# Patient Record
Sex: FEMALE | Race: WHITE | NOT HISPANIC OR LATINO | Employment: UNEMPLOYED | URBAN - METROPOLITAN AREA
[De-identification: names, ages, dates, MRNs, and addresses within clinical notes are randomized per-mention and may not be internally consistent; named-entity substitution may affect disease eponyms.]

---

## 2019-01-03 ENCOUNTER — CONVERSION ENCOUNTER (OUTPATIENT)
Dept: ORTHOPEDIC SURGERY | Facility: CLINIC | Age: 44
End: 2019-01-03

## 2019-01-03 ENCOUNTER — OFFICE VISIT CONVERTED (OUTPATIENT)
Dept: ORTHOPEDIC SURGERY | Facility: CLINIC | Age: 44
End: 2019-01-03
Attending: PHYSICIAN ASSISTANT

## 2019-08-09 ENCOUNTER — HOSPITAL ENCOUNTER (OUTPATIENT)
Dept: MAMMOGRAPHY | Facility: HOSPITAL | Age: 44
Discharge: HOME | End: 2019-08-09
Attending: NURSE PRACTITIONER

## 2019-08-16 ENCOUNTER — TELEPHONE (OUTPATIENT)
Dept: SURGERY | Facility: CLINIC | Age: 44
End: 2019-08-16

## 2019-08-16 NOTE — TELEPHONE ENCOUNTER
New patient appt to see Dr. Adan    Right breast tenderness, comes and goes,   The pain or tenderness is at Right Breast @ previously biopsied 2016 location 11:00.     Scheduled to see Dr. Miranda Friday 9/20/19 1030 arrival, 11:00 appt.     lml

## 2019-09-23 ENCOUNTER — OFFICE VISIT (OUTPATIENT)
Dept: SURGERY | Facility: CLINIC | Age: 44
End: 2019-09-23

## 2019-09-23 ENCOUNTER — TELEPHONE (OUTPATIENT)
Dept: SURGERY | Facility: CLINIC | Age: 44
End: 2019-09-23

## 2019-09-23 VITALS
DIASTOLIC BLOOD PRESSURE: 88 MMHG | HEIGHT: 64 IN | BODY MASS INDEX: 36.88 KG/M2 | WEIGHT: 216 LBS | OXYGEN SATURATION: 98 % | SYSTOLIC BLOOD PRESSURE: 128 MMHG | HEART RATE: 101 BPM

## 2019-09-23 DIAGNOSIS — N64.4 MASTODYNIA: Primary | ICD-10-CM

## 2019-09-23 DIAGNOSIS — Z80.3 FH: BREAST CANCER: ICD-10-CM

## 2019-09-23 PROCEDURE — 99243 OFF/OP CNSLTJ NEW/EST LOW 30: CPT | Performed by: SURGERY

## 2019-09-23 RX ORDER — ESTRADIOL 10 UG/1
INSERT VAGINAL
Refills: 12 | COMMUNITY
Start: 2019-07-31 | End: 2022-03-29

## 2019-09-23 NOTE — TELEPHONE ENCOUNTER
Patient let me know that she is getting recurrent yeast infections since on estradiol and would like to know if we could call her in something for this.     I called to let patient know that she should contact her gynecologist for this.   I had to leave a message for patient. ramos

## 2019-09-23 NOTE — PROGRESS NOTES
Chief Complaint: Kerry Collins is a 44 y.o. female who was seen in consultation at the request of Smita Aaron APRN  for breast pain    History of Present Illness:  Patient presents with breast pain. She noted no new masses, skin changes, nipple discharge, nipple changes prior to her most recent imaging.  She reports right-sided breast pain for many years.  She estimates it is been about 4 years.  She describes it as a swelling and pain in the right breast that is achy.  She describes it as being present intermittently all of the time.  She tells me that it was present before her 2016 biopsy and after her 2016 biopsy.  She tells me that it has not changed since she started taking estrogen about a month ago.  She drinks one 16 ounce Coke per day.  She tells me that she gained about 50 pounds 19 years ago but has been stable at this current weight for the past 15 or 20 years.    Her most recent imaging includes the followin2015  [facility]  Kerry Mckoy  Screening mammography.  Baseline mammogram.  Scattered fibroglandular tissue.  BIRADS 2    10/27/2015  [facility]     Oncology  Kerry Dennis  Surgical Oncology New Patient Visit.    2016  [facility]  Kerry Collins  BILATERAL DIAGNOSTIC MAMMOGRAM  Persistent 6 mm mass upper outer right breast middle 1/3.  Focused ultrasound upper outer right breast a few tiny cysts. 5 mm hypoechoic mass 11:00 position 10 cm from the nipple.  DIAGNOSTIC CATEGORY: 4A    2019  [facility]  Kerry Collins  DIGITAL DIAGNOSTIC BILAT AVRIL AND 3D DENISSE  Scattered fibroglandular density. 4 mm rounded mass right breast 11:00, just deep to the palpable marker. There is a biopsy marker clip in the lesion in the posterior upper outer quadrant of the right breast.  DIAGNOSTIC CATEGORY: 2, BENIGN FINDING      She had a biopsy on the following day that showed:   2016  [facility]   Radiology  Kerry Collins  A 13 gauge. A total of 6 core  biopsy. A biopsy clip was then placed.  Pathology is now available which reveals fragments suggestive of fibroadenoma. This is concordant.    09/28/2016  [facility]   Radiology  Kerry Collins  DIAGNOSTIC POST BIOPSY  Biopsy clip appears to be in the area of nodule of concern.    09/28/2016  [facility]   Pathology  Kerry Collins  RIGHT BREAST, ULTRASOUND-GUIDED CORE BIOPSY:  - SMALL FRAGMENT SUGGESTIVE OF FIBROADENOMA, IN A BACKGROUND OF FIBROFATTY BREAST TISSUE.        She has her ovaries, is postmenopausal, and takes estradiol twice a week.  She is been on this for about 1 month.  She had her uterus removed at age 40 for fibroids.  Her family history includes the following: She has 1 daughter, no sisters, 5 maternal aunts, no paternal aunts.  She has a maternal grandmother who had breast cancer at 75, a maternal grandfather who had pancreas cancer, a paternal grandmother who had breast cancer at uncertain age, a maternal aunt who had breast cancer at uncertain age, a maternal cousin female who had breast cancer at 35 of paternal cousin female who had breast cancer at 35.  She tells me that her paternal cousin had a BRCA mutation.  The patient had a my risk test sent April 14, 2015 that was negative for mutation.  She is here for evaluation.    Review of Systems:  Review of Systems   Constitutional: Positive for chills, diaphoresis, fatigue and unexpected weight change. Negative for appetite change.   Endocrine: Positive for hot flashes.   Musculoskeletal: Positive for gait problem.   Neurological: Positive for dizziness, gait problem, light-headedness and speech difficulty.   Psychiatric/Behavioral: Positive for confusion. The patient is nervous/anxious.    All other systems reviewed and are negative.       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  Breast Operations, and year:  NONE   Obstetric/Gynecologic History:  Age menstrual periods began: 14  Patient is postmenopausal due to removal of her uterus in the following year: 2015   Number of pregnancies:7  Number of live births: 5  Number of abortions or miscarriages: 2  Age of delivery of first child: 18  Patient breast fed, for the following lenth of time: COUPLE WEEKS   Length of time taking birth control pills: FEW MONTHS   Patient is presently taking the following hormone replacement: ESTRADIOL 10 MCG VAGINAL TABLET 2 X WEEK.     PATIENT HAD UTERUS REMOVED 2015, HAS BOTH OVARIES.     Past Surgical History:   Procedure Laterality Date   • BREAST BIOPSY     • GALLBLADDER SURGERY     • HYSTERECTOMY     • LASER ABLATION     • TUBAL ABDOMINAL LIGATION         Past Medical History:   Diagnosis Date   • Arthritis    • Breast pain    • HTN (hypertension)    • Vaginal yeast infection        Prior Hospitalizations, other than for surgery or childbirth, and year:  NONE     Social History     Socioeconomic History   • Marital status:      Spouse name: Not on file   • Number of children: Not on file   • Years of education: Not on file   • Highest education level: Not on file   Tobacco Use   • Smoking status: Never Smoker   • Smokeless tobacco: Never Used   Substance and Sexual Activity   • Alcohol use: Yes     Comment: OCC   • Drug use: No     Patient is .  Patient is employed full time with the following occupation:   Patient drinks 2-3 servings of caffeine per day.    Family History:  Family History   Problem Relation Age of Onset   • Lung cancer Mother    • Cancer Brother         ORAL/MOUTH   • Breast cancer Maternal Aunt         UNKNOWN AGE    • Cancer Paternal Uncle         UNKNOWN ORGIN    • Breast cancer Maternal Grandmother         70S   • Pancreatic cancer Maternal Grandfather         UNKNOWN AGE    • Breast cancer Paternal Grandmother         UNKNOWN AGE    •  "Breast cancer Cousin         30S   • Breast cancer Cousin         30S   • Cancer Brother         BONE   • Lung cancer Maternal Aunt    • Canavan disease Maternal Aunt    • Cancer Maternal Aunt         BLADDER       Vital Signs:  /88 (BP Location: Left arm, Patient Position: Sitting, Cuff Size: Adult)   Pulse 101   Ht 162.6 cm (64\")   Wt 98 kg (216 lb)   LMP  (LMP Unknown)   SpO2 98%   Breastfeeding? No   BMI 37.08 kg/m²      Medications:    Current Outpatient Medications:   •  Acetaminophen (TYLENOL ARTHRITIS PAIN PO), Take  by mouth., Disp: , Rfl:   •  estradiol (VAGIFEM) 10 MCG tablet vaginal tablet, , Disp: , Rfl: 12  •  metoprolol tartrate (LOPRESSOR) 25 MG tablet, Take 25 mg by mouth 2 (Two) Times a Day., Disp: , Rfl:      Allergies:  Allergies   Allergen Reactions   • Codeine Hives       Physical Examination:  /88 (BP Location: Left arm, Patient Position: Sitting, Cuff Size: Adult)   Pulse 101   Ht 162.6 cm (64\")   Wt 98 kg (216 lb)   LMP  (LMP Unknown)   SpO2 98%   Breastfeeding? No   BMI 37.08 kg/m²   General Appearance:  Patient is in no distress.  She is well kept and has an obese build.   Psychiatric:  Patient with appropriate mood and affect. Alert and oriented to self, time, and place.    Breast, RIGHT:  large sized, asymmetric with the contralateral side, right breast larger than left by about a full cup size..  Breast skin is without erythema, edema, rashes.  There are no visible abnormalities upon inspection during the arm-raising maneuver or with hands on hips in the sitting position. There is no nipple retraction, discharge or nipple/areolar skin changes.There are no masses palpable in the sitting or supine positions.  There is tenderness in the right breast upper outer quadrant location.  There is some glandular density in this area but no masses or focal findings.  There are no masses or skin changes in this location.    Breast, LEFT:  large sized, asymmetric with the " contralateral side, as above.  Breast skin is without erythema, edema, rashes.  There are no visible abnormalities upon inspection during the arm-raising maneuver or with hands on hips in the sitting position. There is no nipple retraction, discharge or nipple/areolar skin changes.There are no masses palpable in the sitting or supine positions.    Lymphatic:  There is no axillary, cervical, infraclavicular, or supraclavicular adenopathy bilaterally.  Eyes:  Pupils are round and reactive to light.  Cardiovascular:  Heart rate and rhythm are regular.  Respiratory:  Lungs are clear bilaterally with no crackles or wheezes in any lung field.  Gastrointestinal:  Abdomen is soft, nondistended, and nontender.     Musculoskeletal:  Good strength in all 4 extremities.   There is good range of motion in both shoulders.    Skin:  No new skin lesions or rashes on the skin excluding the breast (see breast exam above).      Imagin2015  [facility]     [facility]  Screening mammography.  Baseline mammogram.  Scattered fibroglandular tissue.  BIRADS 2    10/27/2015  [facility]     Oncology  [facility]  Surgical Oncology New Patient Visit.    2016  [facility]  BILATERAL DIAGNOSTIC MAMMOGRAM  Persistent 6 mm mass upper outer right breast middle 1/3.  Focused ultrasound upper outer right breast a few tiny cysts. 5 mm hypoechoic mass 11:00 position 10 cm from the nipple.  DIAGNOSTIC CATEGORY: 4A    2019  [facility]  DIGITAL DIAGNOSTIC BILAT AVRIL AND 3D DENISSE  Scattered fibroglandular density. 4 mm rounded mass right breast 11:00, just deep to the palpable marker. There is a biopsy marker clip in the lesion in the posterior upper outer quadrant of the right breast.  DIAGNOSTIC CATEGORY: 2, BENIGN FINDING    Pathology:  2016  [facility]   Radiology  [facility]  A 13 gauge. A total of 6 core biopsy. A biopsy clip was then placed.  Pathology is now  available which reveals fragments suggestive of fibroadenoma. This is concordant.    09/28/2016  [facility]   Radiology  Kerry Collins  DIAGNOSTIC POST BIOPSY  Biopsy clip appears to be in the area of nodule of concern.    09/28/2016  [facility]   Pathology  Kerry Collins  RIGHT BREAST, ULTRASOUND-GUIDED CORE BIOPSY:  - SMALL FRAGMENT SUGGESTIVE OF FIBROADENOMA, IN A BACKGROUND OF FIBROFATTY BREAST TISSUE.      LABS:   04/14/2015  [facility]   Genetic   Kerry Mckoy  GENETIC RESULT: NEGATIVE - NO CLINICALLY SIGNIFICANT MUTATION IDENTIFIED.          07/31/2019  [facility]  Office Visit  Kerry Collins  Previously consulted with Dr. Holley and Dr. Godwin regarding right breast pain.    Procedures:      Assessment:   Diagnosis Plan   1. Mastodynia     2. FH: breast cancer     Right breast upper outer quadrant, present for 4 years    2-  Maternal grandmother, paternal grandmother, maternal aunt, maternal cousin, paternal cousin.  The paternal cousin was BRCA positive.  The patient is BRCA negative as tested by a my risk panel in April 2015.      Plan:  Kerry and AMINATA reviewed her history, imaging, imaging reports and examination together today.  We discussed that the differential diagnosis of breast pain includes, but is not limited to: fibrocystic condition, macrocysts, fibroadenomas, breast cancer,  trauma to the breast or chest wall, inflammation or  other musculoskeletal disturbances of the chest wall.  There are no concerning findings on her examination today or on her most recent imaging for a focal cause of her pain- ie a mass, inflammation, or trauma. Both her exam and imaging are in good order. The most likely etiology of her breast pain is fibrocystic condition, meaning a hormonal responsiveness of the breast tissue.  We discussed that this pain can be aggravated by many things, including stress, caffeine, and fluctuations in hormone levels.     She has a  who intermittently commits  adultery and this has been problematic in their life for several years.  She is also feeling trapped within this relationship due to financial limitations.  We discussed that the significance of this external stressor and rise in her cortisol could contribute to her breast pain.  We discussed that her 16 ounce cola could contribute to her breast pain.  Her weight gain was long enough ago that I do not think this is a significant factor.  Her large breast side could contribute and the breast asymmetry makes it a little harder to find a good fitting bra.  We did show her her compression type bras in the office today that have adjustable straps where she could get adequate compression on both sides despite the asymmetry.       We discussed the most common interventions to alleviate her symptoms, including wearing a supportive bra, reducing caffeine intake, oral vitamin E 400 units qday or BID, or evening primrose oil 2000-3000mg orally daily. She understood.  We gave her a handout on this today.    We also discussed that the newly started estrogen may or may not result in an increase in her breast tenderness, as above.    We also discussed her family history.  The fact that her cousin had a BRCA mutation that she does not carry is quite favorable and we discussed this today.  I did calculate her lifetime risk of breast cancer using the CRA risk express and this calculated risk anywhere from 11 to 29%.  We discussed the option for MRI surveillance in addition to mammography and she was not interested in this at this time.      I asked her to continue her self breast exam and routine breast cancer screening and to call us in the future with any concerns would be happy to see her back.      Alexus Miranda MD        We have spent 40 minutes in visit today, 25 in face to face .      Next Appointment:  Return for any future concerns.      EMR Dragon/transcription disclaimer:    Much of this encounter note is an  electronic transcription/translocation of spoken language to printed text.  The electronic translation of spoken language may permit erroneous, or at times, nonsensical words or phrases to be inadvertently transcribed.  Although I have reviewed the note from such areas, some may still exist.

## 2019-10-14 ENCOUNTER — HOSPITAL ENCOUNTER (OUTPATIENT)
Dept: URGENT CARE | Facility: CLINIC | Age: 44
Discharge: HOME | End: 2019-10-14
Attending: FAMILY MEDICINE

## 2019-10-16 LAB — BACTERIA SPEC AEROBE CULT: NORMAL

## 2020-06-11 ENCOUNTER — OFFICE VISIT CONVERTED (OUTPATIENT)
Dept: GASTROENTEROLOGY | Facility: CLINIC | Age: 45
End: 2020-06-11
Attending: NURSE PRACTITIONER

## 2020-06-11 ENCOUNTER — CONVERSION ENCOUNTER (OUTPATIENT)
Dept: GASTROENTEROLOGY | Facility: CLINIC | Age: 45
End: 2020-06-11

## 2020-06-12 ENCOUNTER — HOSPITAL ENCOUNTER (OUTPATIENT)
Dept: GENERAL RADIOLOGY | Facility: HOSPITAL | Age: 45
Discharge: HOME | End: 2020-06-12
Attending: NURSE PRACTITIONER

## 2020-06-14 LAB — SARS-COV-2 RNA SPEC QL NAA+PROBE: NOT DETECTED

## 2020-06-16 ENCOUNTER — HOSPITAL ENCOUNTER (OUTPATIENT)
Dept: GASTROENTEROLOGY | Facility: HOSPITAL | Age: 45
Setting detail: HOSPITAL OUTPATIENT SURGERY
Discharge: HOME | End: 2020-06-16
Attending: INTERNAL MEDICINE

## 2020-08-05 ENCOUNTER — HOSPITAL ENCOUNTER (OUTPATIENT)
Dept: LAB | Facility: HOSPITAL | Age: 45
Discharge: HOME | End: 2020-08-05
Attending: NURSE PRACTITIONER

## 2020-08-06 ENCOUNTER — OFFICE VISIT CONVERTED (OUTPATIENT)
Dept: GASTROENTEROLOGY | Facility: CLINIC | Age: 45
End: 2020-08-06
Attending: NURSE PRACTITIONER

## 2020-08-24 ENCOUNTER — HOSPITAL ENCOUNTER (OUTPATIENT)
Dept: PREADMISSION TESTING | Facility: HOSPITAL | Age: 45
Discharge: HOME | End: 2020-08-24
Attending: INTERNAL MEDICINE

## 2020-08-25 LAB — SARS-COV-2 RNA SPEC QL NAA+PROBE: NOT DETECTED

## 2020-08-28 ENCOUNTER — HOSPITAL ENCOUNTER (OUTPATIENT)
Dept: GASTROENTEROLOGY | Facility: HOSPITAL | Age: 45
Setting detail: HOSPITAL OUTPATIENT SURGERY
Discharge: HOME | End: 2020-08-28
Attending: INTERNAL MEDICINE

## 2020-10-12 ENCOUNTER — HOSPITAL ENCOUNTER (OUTPATIENT)
Dept: URGENT CARE | Facility: CLINIC | Age: 45
Discharge: HOME | End: 2020-10-12
Attending: EMERGENCY MEDICINE

## 2020-10-20 ENCOUNTER — HOSPITAL ENCOUNTER (OUTPATIENT)
Dept: URGENT CARE | Facility: CLINIC | Age: 45
Discharge: HOME | End: 2020-10-20
Attending: EMERGENCY MEDICINE

## 2020-10-20 ENCOUNTER — HOSPITAL ENCOUNTER (OUTPATIENT)
Dept: PREADMISSION TESTING | Facility: HOSPITAL | Age: 45
Discharge: HOME | End: 2020-10-20
Attending: INTERNAL MEDICINE

## 2020-10-20 ENCOUNTER — OFFICE VISIT CONVERTED (OUTPATIENT)
Dept: GASTROENTEROLOGY | Facility: CLINIC | Age: 45
End: 2020-10-20
Attending: NURSE PRACTITIONER

## 2020-10-20 LAB — SARS-COV-2 RNA SPEC QL NAA+PROBE: NOT DETECTED

## 2020-10-21 ENCOUNTER — HOSPITAL ENCOUNTER (OUTPATIENT)
Dept: GASTROENTEROLOGY | Facility: HOSPITAL | Age: 45
Setting detail: HOSPITAL OUTPATIENT SURGERY
Discharge: HOME | End: 2020-10-21
Attending: INTERNAL MEDICINE

## 2020-11-12 ENCOUNTER — HOSPITAL ENCOUNTER (OUTPATIENT)
Dept: MAMMOGRAPHY | Facility: HOSPITAL | Age: 45
Discharge: HOME | End: 2020-11-12
Attending: NURSE PRACTITIONER

## 2020-11-18 ENCOUNTER — HOSPITAL ENCOUNTER (OUTPATIENT)
Dept: PREADMISSION TESTING | Facility: HOSPITAL | Age: 45
Discharge: HOME | End: 2020-11-18
Attending: INTERNAL MEDICINE

## 2020-11-18 ENCOUNTER — OFFICE VISIT CONVERTED (OUTPATIENT)
Dept: GASTROENTEROLOGY | Facility: CLINIC | Age: 45
End: 2020-11-18
Attending: NURSE PRACTITIONER

## 2020-11-18 LAB — SARS-COV-2 RNA SPEC QL NAA+PROBE: NOT DETECTED

## 2020-11-20 ENCOUNTER — HOSPITAL ENCOUNTER (OUTPATIENT)
Dept: GASTROENTEROLOGY | Facility: HOSPITAL | Age: 45
Setting detail: HOSPITAL OUTPATIENT SURGERY
Discharge: HOME | End: 2020-11-20
Attending: INTERNAL MEDICINE

## 2021-01-26 ENCOUNTER — OFFICE VISIT CONVERTED (OUTPATIENT)
Dept: GASTROENTEROLOGY | Facility: CLINIC | Age: 46
End: 2021-01-26
Attending: NURSE PRACTITIONER

## 2021-02-23 ENCOUNTER — HOSPITAL ENCOUNTER (OUTPATIENT)
Dept: URGENT CARE | Facility: CLINIC | Age: 46
Discharge: HOME | End: 2021-02-23
Attending: FAMILY MEDICINE

## 2021-04-21 ENCOUNTER — HOSPITAL ENCOUNTER (OUTPATIENT)
Dept: URGENT CARE | Facility: CLINIC | Age: 46
Discharge: HOME | End: 2021-04-21
Attending: FAMILY MEDICINE

## 2021-05-10 NOTE — H&P
History and Physical      Patient Name: Kerry Collins   Patient ID: [id number]   Sex: Female   YOB: 1975    Primary Care Provider: Gerald Akhtar MD   Referring Provider: Gerald Akhtar MD    Visit Date: June 11, 2020    Provider: DELPHINE Painting   Location: [facility]   Location Address: [address] [place] [address], [address]  [place], [place]  [id number]   Location Phone: [phone]          Chief Complaint  · Dysphagia      History Of Present Illness  The patient is a 45 year old /White female who presents on referral from Gerald Akhtar MD for a gastroenterology evaluation.      She presents for evaluation of dysphagia.      States that about two years ago, she noticed things getting stuck when swallowing, but it didn't happen often.      She now feels like she's choking more frequently, even with liquids.      For the past few weeks, if she eats anything, gets pain in mid chest and feels like she's choking.  She then has to regurgitate to get food to move.  She has tried to drink to get food to pass, but has been unable to.      She is able to get yogurt to go down, but continues to experience pain.  She has experienced a 10 lb. weight loss in the past 2 weeks.      She reports having small bowel movements since not being able to eat.  Denies rectal bleeding.      She reports a family history of various cancers that she is unsure of.  However, her mother did have lung cancer.       Past Medical History  Cancer; Convulsions; Hypertension; Neuropathy; Thyroid Problems         Past Surgical History  Ablation; Cholecystectomy; Hysterectomy; Tubal ligation         Medication List  albuterol sulfate inhalation         Allergy List  Codeine Phosphate       Allergies Reconciled  Family Medical History  Breast Neoplasm, Malignant; Stroke; Heart Disease; Cancer, Unspecified; Diabetes, unspecified type; Blood Diseases; Family history of certain chronic disabling diseases; arthritis  "        Social History  Alcohol Use (Current some day); Family History of Substance Use/Abuse; lives with spouse; .; Recreational Drug Use (Never); Tobacco (Never)         Review of Systems  · Constitutional  o Denies  o : chills, fever  · Eyes  o Denies  o : blurred vision, changes in vision  · Cardiovascular  o Denies  o : chest pain, irregular heart beats  · Respiratory  o Denies  o : shortness of breath, cough  · Gastrointestinal  o Admits  o : See HPI  · Genitourinary  o Denies  o : dysuria, blood in urine  · Integument  o Denies  o : rash, new skin lesions  · Neurologic  o Denies  o : tingling or numbness, seizures  · Musculoskeletal  o Denies  o : joint pain, joint swelling  · Endocrine  o Admits  o : weight loss  o Denies  o : weight gain  · Psychiatric  o Denies  o : anxiety, depression      Vitals  Date Time BP Position Site L\R Cuff Size HR RR TEMP (F) WT  HT  BMI kg/m2 BSA m2 O2 Sat HC       06/11/2020 01:11 /85 Sitting      98.8 221lbs 5oz 5'  4\" 37.99 2.13           Physical Examination  · Constitutional  o Appearance  o : well developed, well-nourished, in no acute distress  · Eyes  o Vision  o :   § Visual Fields  § : eyes move symmetrical in all directions  o Sclerae  o : anicteric  o Pupils and Irises  o : pupils equal and symmetrical  · Neck  o Inspection/Palpation  o : supple  · Respiratory  o Respiratory Effort  o : breathing unlabored  o Inspection of Chest  o : normal appearance, no retractions  o Auscultation of Lungs  o : clear to auscultation bilaterally  · Cardiovascular  o Heart  o :   § Auscultation of Heart  § : no murmurs, gallops or rubs  · Gastrointestinal  o Abdominal Examination  o : soft, nontender to palpation, with normal active bowel sounds, no appreciable hepatosplenomegaly  o Digital Rectal Exam  o : deferred  · Lymphatic  o Neck  o : no palpable lymphadenopathy  · Skin and Subcutaneous Tissue  o General Inspection  o : without focal lesions; turgor is " normal  · Psychiatric  o General  o : Alert and oriented x3  o Mood and Affect  o : Mood and affect are appropriate to circumstances  · Extremities  o Extremities  o : No edema, no cyanosis          Assessment  · Esophageal dysphagia     787.20/R13.10  · Weight loss     783.21/R63.4  · Atypical chest pain     786.59/R07.89  · Odynophagia     787.20/R13.10  · Preop testing     V72.84/Z01.818      Plan  · Orders  o Esophagram (H) (91025) - - 06/11/2020  o Consent for Esophagogastroduodenoscopy (EGD) - Possible risks/complications, benefits, and alternatives to surgical or invasive procedure have been explained to patient and/or legal guardian. - Patient has been evaluated and can tolerate anesthesia and/or sedation. Risks, benefits, and alternatives to anesthesia and sedation have been explained to patient and/or legal guardian. (40781) - - 06/11/2020  o [place] Pre-Op Covid-19 Screening (48679) - - 06/11/2020  · Medications  o Medications have been Reconciled  · Instructions  o Handouts provided: Pre-procedure instructions including date and time and location of procedure.  o PLAN: Proceed with procedure. Patient understands risks and benefits and is willing to proceed. Understands the risks include, but are not limited to, bleeding and/or perforation.  o Information given on current diagnoses.  o Electronically Identified Patient Education Materials Provided Electronically  · Disposition  o Follow Up after procedure            Electronically Signed by: DELPHINE Painting -Author on June 12, 2020 01:44:17 PM

## 2021-05-13 NOTE — PROGRESS NOTES
Progress Note      Patient Name: Kerry Collins   Patient ID: [id number]   Sex: Female   YOB: 1975    Primary Care Provider: Gerald Akhtar MD   Referring Provider: Gerald Akhtar MD    Visit Date: October 20, 2020    Provider: DELPHINE Painting   Location: [facility]   Location Address: [address] [place] [address], [address]  [place], [place]  [id number]   Location Phone: [phone]          Chief Complaint  · Follow-up of EGD      History Of Present Illness     Ms. Collins presents for follow-up of pharyngeal esophageal dysphagia, reflux esophagitis, esophageal stricture.    8/28/2020 EGD-Schatzki's ring in the GE junction.  Dilated with 15 mm balloon.  Biopsy-chronic inflammation and focal parakeratosis compatible with stricture.  Negative for intestinal metaplasia.  Normal stomach and duodenum.    She reports that dysphagia was improved following EGD for a few weeks.  However, she is now back to choking daily.   States that this is worse with solids.  When she tries to drink after eating, it seems to make things worse.  She has lost 12 lbs. since August due to inability to eat.  Denies heartburn.      Denies change in bowel pattern, hematochezia and melena.                   Past Medical History  Cancer; Convulsions; Hypertension; Neuropathy; Thyroid Problems         Past Surgical History  Ablation; Cholecystectomy; EGD; Hysterectomy; Tubal ligation         Medication List  Protonix 40 mg oral tablet,delayed release (DR/EC)         Allergy List  Codeine Phosphate       Allergies Reconciled  Family Medical History  Breast Neoplasm, Malignant; Stroke; Heart Disease; Cancer, Unspecified; Diabetes, unspecified type; Blood Diseases; Family history of certain chronic disabling diseases; arthritis         Social History  Alcohol Use (Current some day); Family History of Substance Use/Abuse; lives with spouse; .; Recreational Drug Use (Never); Tobacco (Never)         Review of  "Systems  · Constitutional  o Denies  o : chills, fever  · Cardiovascular  o Denies  o : chest pain, irregular heart beats  · Respiratory  o Denies  o : cough, shortness of breath  · Gastrointestinal  o Admits  o : see HPI   · Endocrine  o Denies  o : weight gain, weight loss      Vitals  Date Time BP Position Site L\R Cuff Size HR RR TEMP (F) WT  HT  BMI kg/m2 BSA m2 O2 Sat FR L/min FiO2 HC       10/20/2020 09:13 /69 Sitting      98.4 207lbs 6oz 5'  4\" 35.6 2.06             Physical Examination  · Constitutional  o Appearance  o : Healthy-appearing, awake and alert in no acute distress  · Head and Face  o Head  o : Normocephalic with no worriesome skin lesions  · Eyes  o Vision  o :   § Visual Fields  § : eyes move symmetrical in all directions  o Sclerae  o : sclerae anicteric  o Pupils and Irises  o : pupils equal and symmetrical  · Neck  o Inspection/Palpation  o : Trachea is midline, no adenopathy  · Respiratory  o Respiratory Effort  o : Breathing is unlabored.  o Inspection of Chest  o : normal appearance  o Auscultation of Lungs  o : Chest is clear to auscultation bilaterally.  · Cardiovascular  o Heart  o :   § Auscultation of Heart  § : no murmurs, rubs, or gallops  o Peripheral Vascular System  o :   § Extremities  § : no cyanosis, clubbing or edema;   · Gastrointestinal  o Abdominal Examination  o : Abdomen is soft, nontender to palpation, with normal active bowel sounds, no appreciable hepatosplenomegaly.  o Digital Rectal Exam  o : deferred  · Skin and Subcutaneous Tissue  o General Inspection  o : without focal lesions; turgor is normal  · Psychiatric  o General  o : Alert and oriented x3  o Mood and Affect  o : Mood and affect are appropriate to circumstances  · Extremities  o Extremities  o : No edema, no cyanosis          Assessment  · Pharyngoesophageal dysphagia     787.24/R13.14  · Esophageal stricture     530.3/K22.2  · Pre-op testing     V72.84/Z01.818      Plan  · Orders  o Consent for " Esophagogastroduodenoscopy (EGD) with dilation - Possible risks/complications, benefits, and alternatives to surgical or invasive procedure have been explained to patient and/or legal guardian. - Patient has been evaluated and can tolerate anesthesia and/or sedation. Risks, benefits, and alternatives to anesthesia and sedation have been explained to patient and/or legal guardian. (29067) - - 10/20/2020  o [facility] [place] Pre-Op Covid-19 Screening (16492) - - 10/20/2020  · Medications  o Medications have been Reconciled  · Instructions  o Handouts provided: Pre-procedure instructions including date and time and location of procedure.  o PLAN: Proceed with procedure. Patient understands risks and benefits and is willing to proceed. Understands the risks include, but are not limited to, bleeding and/or perforation.  o Information given on current diagnoses.  o Electronically Identified Patient Education Materials Provided Electronically  · Disposition  o Follow Up after procedure            Electronically Signed by: DELPHINE Painting -Author on October 20, 2020 09:26:18 AM

## 2021-05-13 NOTE — PROGRESS NOTES
Progress Note      Patient Name: Kerry Collins   Patient ID: [id number]   Sex: Female   YOB: 1975    Primary Care Provider: Gerald Akhtar MD   Referring Provider: Gerald Akhtar MD    Visit Date: November 18, 2020    Provider: DELPHINE Painting   Location: [facility]   Location Address: [address] [place] [address], [address]  [place], [place]  [id number]   Location Phone: [phone]          Chief Complaint  · Follow-up of EGD      History Of Present Illness     Ms. Collins presents for follow-up of pharyngeal esophageal dysphagia and esophageal stricture.    10/21/2020 EGD-benign stricture seen in the GE junction.  Balloon introduced for dilation.  16.5 mm is the largest diameter used.  Grade a esophagitis in the GE junction.  Small hiatal hernia.  Normal stomach and duodenum.  Carafate 1 g 4 times daily sent.  Recommend patient to dissolve in 10 mils of tap water prior to administration. She reports that symptoms were improved for about 1 week.  She took Carafate as directed.    States that she's having chest pain and vomiting with every meal.      She's had to buy new clothes due to weight loss.      Reports that she sometimes does okay with liquids, but she does notice having a difficult time with milkshakes and ice cream.  She's able to tolerate warm or hot beverages.      She is drinking 2 ensure per day.  Trying to eat some food, but vomits after eating.                   Past Medical History  Cancer; Convulsions; Hypertension; Neuropathy; Thyroid Problems         Past Surgical History  Ablation; Cholecystectomy; EGD; Hysterectomy; Tubal ligation         Medication List  Protonix 40 mg oral tablet,delayed release (DR/EC)         Allergy List  Codeine Phosphate       Allergies Reconciled  Family Medical History  Breast Neoplasm, Malignant; Stroke; Heart Disease; Cancer, Unspecified; Diabetes, unspecified type; Blood Diseases; Family history of certain chronic disabling  "diseases; arthritis         Social History  Alcohol Use (Current some day); Family History of Substance Use/Abuse; lives with spouse; .; Recreational Drug Use (Never); Tobacco (Never)         Review of Systems  · Constitutional  o Denies  o : chills, fever  · Cardiovascular  o Denies  o : chest pain, irregular heart beats  · Respiratory  o Denies  o : cough, shortness of breath  · Gastrointestinal  o Admits  o : see HPI   · Endocrine  o Admits  o : weight loss  o Denies  o : weight gain      Vitals  Date Time BP Position Site L\R Cuff Size HR RR TEMP (F) WT  HT  BMI kg/m2 BSA m2 O2 Sat FR L/min FiO2 HC       11/18/2020 09:14 AM 99/57 Sitting      98.4 205lbs 6oz 5'  4\" 35.25 2.05             Physical Examination  · Constitutional  o Appearance  o : Healthy-appearing, awake and alert in no acute distress  · Head and Face  o Head  o : Normocephalic with no worriesome skin lesions  · Eyes  o Vision  o :   § Visual Fields  § : eyes move symmetrical in all directions  o Sclerae  o : sclerae anicteric  o Pupils and Irises  o : pupils equal and symmetrical  · Neck  o Inspection/Palpation  o : Trachea is midline, no adenopathy  · Respiratory  o Respiratory Effort  o : Breathing is unlabored.  o Inspection of Chest  o : normal appearance  o Auscultation of Lungs  o : Chest is clear to auscultation bilaterally.  · Cardiovascular  o Heart  o :   § Auscultation of Heart  § : no murmurs, rubs, or gallops  o Peripheral Vascular System  o :   § Extremities  § : no cyanosis, clubbing or edema;   · Gastrointestinal  o Abdominal Examination  o : Abdomen is soft, nontender to palpation, with normal active bowel sounds, no appreciable hepatosplenomegaly.  o Digital Rectal Exam  o : deferred  · Skin and Subcutaneous Tissue  o General Inspection  o : without focal lesions; turgor is normal  · Psychiatric  o General  o : Alert and oriented x3  o Mood and Affect  o : Mood and affect are appropriate to " circumstances  · Extremities  o Extremities  o : No edema, no cyanosis          Assessment  · Atypical chest pain     786.59/R07.89  · Esophageal dysphagia     787.20/R13.10  · Esophagitis, Reflux     530.11/K21.0  · Pre-op testing     V72.84/Z01.818  · Esophageal stricture     530.3/K22.2      Plan  · Orders  o Consent for Esophagogastroduodenoscopy (EGD) with dilation - Possible risks/complications, benefits, and alternatives to surgical or invasive procedure have been explained to patient and/or legal guardian. - Patient has been evaluated and can tolerate anesthesia and/or sedation. Risks, benefits, and alternatives to anesthesia and sedation have been explained to patient and/or legal guardian. (38103) - - 11/18/2020  o [facility] [place] Pre-Op Covid-19 Screening (61968) - - 11/18/2020  · Medications  o Protonix 40 mg oral tablet,delayed release (DR/EC)   SIG: take 1 tablet (40 mg) by oral route once daily for 30 days   DISP: (30) Tablet with 5 refills  Refilled on 11/18/2020     o Medications have been Reconciled  · Instructions  o Handouts provided: Pre-procedure instructions including date and time and location of procedure.  o PLAN: Proceed with procedure. Patient understands risks and benefits and is willing to proceed. Understands the risks include, but are not limited to, bleeding and/or perforation.  o Information given on current diagnoses.  o Electronically Identified Patient Education Materials Provided Electronically  · Disposition  o Follow Up after procedure            Electronically Signed by: DELPHINE Painting -Author on November 18, 2020 09:37:13 AM

## 2021-05-13 NOTE — PROGRESS NOTES
Progress Note      Patient Name: Kerry Collins   Patient ID: [id number]   Sex: Female   YOB: 1975    Primary Care Provider: Gerald Akhtar MD   Referring Provider: Gerald Akhtar MD    Visit Date: August 6, 2020    Provider: DELPHINE Painting   Location: [facility]   Location Address: [address] [place] [address], [address]  [place], [place]  [id number]   Location Phone: [phone]          Chief Complaint  · Follow-up of EGD      History Of Present Illness     Ms. Collins presents for follow-up of dysphagia, weight loss, atypical chest pain and odynophagia.    6/12/2020 esophagram-small to moderate hiatal hernia with developing Schatzki's ring in the distal esophagus proximal to the hernia.  This resulted in luminal diameter narrowing approximately 35%.    6/16/2020 EGD-benign intrinsic stricture was seen in the GE junction.  Grade C esophagitis in the GE junction, biopsy-changes consistent with reflux esophagitis.  Small hiatal hernia.  Erythema and congestion of the mucosa noted in the whole stomach.  Gastric antrum biopsies-chronic inactive gastritis, positive for H. pylori.  Normal duodenum.  Rx sent for pantoprazole 40 mg daily.  Recommend repeat EGD for esophageal dilation after treatment of esophagitis.    8/5/2020 H. pylori breath test-negative.    She reports that dysphagia continues.  She is drinking protein drinks, eating yogurt and Jell-O and states that she is tolerating these okay.  She has also noticed difficulty with swallowing liquids including her saliva.  She stopped Protonix due to H. pylori treatment.  She has not restarted Protonix.    Weight stable.       Past Medical History  Cancer; Convulsions; Hypertension; Neuropathy; Thyroid Problems         Past Surgical History  Ablation; Cholecystectomy; EGD; Hysterectomy; Tubal ligation         Allergy List  Codeine Phosphate       Allergies Reconciled  Family Medical History  Breast Neoplasm, Malignant; Stroke; Heart Disease;  "Cancer, Unspecified; Diabetes, unspecified type; Blood Diseases; Family history of certain chronic disabling diseases; arthritis         Social History  Alcohol Use (Current some day); Family History of Substance Use/Abuse; lives with spouse; .; Recreational Drug Use (Never); Tobacco (Never)         Review of Systems  · Constitutional  o Denies  o : chills, fever  · Cardiovascular  o Denies  o : chest pain, irregular heart beats  · Respiratory  o Denies  o : cough, shortness of breath  · Gastrointestinal  o Admits  o : see HPI   · Endocrine  o Denies  o : weight gain, weight loss      Vitals  Date Time BP Position Site L\R Cuff Size HR RR TEMP (F) WT  HT  BMI kg/m2 BSA m2 O2 Sat HC       08/06/2020 08:33 /87 Sitting      97.8 219lbs 6oz 5'  4\" 37.66 2.12           Physical Examination  · Constitutional  o Appearance  o : Healthy-appearing, awake and alert in no acute distress  · Head and Face  o Head  o : Normocephalic with no worriesome skin lesions  · Eyes  o Vision  o :   § Visual Fields  § : eyes move symmetrical in all directions  o Sclerae  o : sclerae anicteric  o Pupils and Irises  o : pupils equal and symmetrical  · Neck  o Inspection/Palpation  o : Trachea is midline, no adenopathy  · Respiratory  o Respiratory Effort  o : Breathing is unlabored.  o Inspection of Chest  o : normal appearance  o Auscultation of Lungs  o : Chest is clear to auscultation bilaterally.  · Cardiovascular  o Heart  o :   § Auscultation of Heart  § : no murmurs, rubs, or gallops  o Peripheral Vascular System  o :   § Extremities  § : no cyanosis, clubbing or edema;   · Gastrointestinal  o Abdominal Examination  o : mild epigastric region tenderness to palpation present, normal bowel sounds, tone normal without rigidity or guarding, no masses present, abdomen scaphoid upon supine, no ascites  o Digital Rectal Exam  o : deferred  · Skin and Subcutaneous Tissue  o General Inspection  o : without focal lesions; turgor is " normal  · Psychiatric  o General  o : Alert and oriented x3  o Mood and Affect  o : Mood and affect are appropriate to circumstances  · Extremities  o Extremities  o : No edema, no cyanosis          Assessment  · Pharyngoesophageal dysphagia     787.24/R13.14  · Esophagitis, Reflux     530.11/K21.0  · Esophageal stricture     530.3/K22.2  · Pre-op testing     V72.84/Z01.818      Plan  · Orders  o Consent for Esophagogastroduodenoscopy (EGD) with dilation - Possible risks/complications, benefits, and alternatives to surgical or invasive procedure have been explained to patient and/or legal guardian. - Patient has been evaluated and can tolerate anesthesia and/or sedation. Risks, benefits, and alternatives to anesthesia and sedation have been explained to patient and/or legal guardian. (29459) - - 08/06/2020  o [facility] [place] Pre-Op Covid-19 Screening (91604) - - 08/06/2020  · Medications  o Protonix 40 mg oral tablet,delayed release (DR/EC)   SIG: take 1 tablet (40 mg) by oral route once daily for 30 days   DISP: (30) tablets with 3 refills  Prescribed on 08/06/2020     o Medications have been Reconciled  o Transition of Care or Provider Policy  · Instructions  o Handouts provided: Pre-procedure instructions including date and time and location of procedure.  o PLAN: Proceed with procedure. Patient understands risks and benefits and is willing to proceed. Understands the risks include, but are not limited to, bleeding and/or perforation.  o Information given on current diagnoses. Encouraged patient to restart Protonix today. Will schedule for repeat EGD for dilation.  o Electronically Identified Patient Education Materials Provided Electronically  · Disposition  o Follow Up after procedure            Electronically Signed by: DELPHINE Painting -Author on August 6, 2020 10:55:04 AM

## 2021-05-14 VITALS
BODY MASS INDEX: 35.4 KG/M2 | TEMPERATURE: 98.4 F | SYSTOLIC BLOOD PRESSURE: 105 MMHG | WEIGHT: 207.37 LBS | HEIGHT: 64 IN | DIASTOLIC BLOOD PRESSURE: 69 MMHG

## 2021-05-14 VITALS
SYSTOLIC BLOOD PRESSURE: 132 MMHG | DIASTOLIC BLOOD PRESSURE: 82 MMHG | HEIGHT: 64 IN | WEIGHT: 201.12 LBS | BODY MASS INDEX: 34.34 KG/M2

## 2021-05-14 VITALS
SYSTOLIC BLOOD PRESSURE: 99 MMHG | WEIGHT: 205.37 LBS | BODY MASS INDEX: 35.06 KG/M2 | TEMPERATURE: 98.4 F | HEIGHT: 64 IN | DIASTOLIC BLOOD PRESSURE: 57 MMHG

## 2021-05-14 NOTE — PROGRESS NOTES
Progress Note      Patient Name: Kerry Collins   Patient ID: [id number]   Sex: Female   YOB: 1975    Primary Care Provider: Gerald Akhtar MD   Referring Provider: Gerald Akhtar MD    Visit Date: January 26, 2021    Provider: DELPHINE Painting   Location: [facility]   Location Address: [address] [place] [address], [address] [place] [address]  [id number]   Location Phone: [phone]          Chief Complaint  · Follow-up of EGD      History Of Present Illness     Ms. Collins presents for follow-up of atypical chest pain, dysphagia, reflux esophagitis and esophageal stricture.      11/20/2020 EGD-Schatzki's ring was found in the GE junction.  Dilated with a balloon.  16.5 mm was the largest diameter used.  Grade A esophagitis in the GE junction, biopsy-active esophagitis.  Normal mucosa in the middle third of the esophagus.  2 cm hiatal hernia.  Erythema in the antrum and stomach body.  Erythema in the first portion of the duodenum.  Recommend increase pantoprazole from 40 mg daily to 40 mg twice daily.  Arrange for pH/impedance and manometry testing.    She is scheduled for manometry this Friday. She is nervous about the testing.  Continues to experience choking with meals and is struggling to eat.  She's lost an additional 4 lbs since previous visit.             Past Medical History  Cancer; Convulsions; Hypertension; Neuropathy; Thyroid Problems         Past Surgical History  Ablation; Cholecystectomy; EGD; Hysterectomy; Tubal ligation         Medication List  Protonix 40 mg oral tablet,delayed release (DR/EC)         Allergy List  Codeine Phosphate       Allergies Reconciled  Family Medical History  Breast Neoplasm, Malignant; Stroke; Heart Disease; Cancer, Unspecified; Diabetes, unspecified type; Blood Diseases; Family history of certain chronic disabling diseases; arthritis         Social History  Alcohol Use (Current some day); Family History of Substance Use/Abuse; lives with  "spouse; .; Recreational Drug Use (Never); Tobacco (Never)         Review of Systems  · Constitutional  o Denies  o : chills, fever  · Cardiovascular  o Denies  o : chest pain, irregular heart beats  · Respiratory  o Denies  o : cough, shortness of breath  · Gastrointestinal  o Admits  o : see HPI   · Endocrine  o Admits  o : weight loss  o Denies  o : weight gain      Vitals  Date Time BP Position Site L\R Cuff Size HR RR TEMP (F) WT  HT  BMI kg/m2 BSA m2 O2 Sat FR L/min FiO2 HC       01/26/2021 01:46 /82 Sitting       201lbs 2oz 5'  4\" 34.52 2.03             Physical Examination  · Constitutional  o Appearance  o : Healthy-appearing, awake and alert in no acute distress  · Head and Face  o Head  o : Normocephalic with no worriesome skin lesions  · Eyes  o Vision  o :   § Visual Fields  § : eyes move symmetrical in all directions  o Sclerae  o : sclerae anicteric  o Pupils and Irises  o : pupils equal and symmetrical  · Neck  o Inspection/Palpation  o : Trachea is midline, no adenopathy  · Respiratory  o Respiratory Effort  o : Breathing is unlabored.  o Inspection of Chest  o : normal appearance  o Auscultation of Lungs  o : Chest is clear to auscultation bilaterally.  · Cardiovascular  o Heart  o :   § Auscultation of Heart  § : no murmurs, rubs, or gallops  o Peripheral Vascular System  o :   § Extremities  § : no cyanosis, clubbing or edema;   · Gastrointestinal  o Abdominal Examination  o : Abdomen is soft, nontender to palpation, with normal active bowel sounds, no appreciable hepatosplenomegaly.  o Digital Rectal Exam  o : deferred  · Skin and Subcutaneous Tissue  o General Inspection  o : without focal lesions; turgor is normal  · Psychiatric  o General  o : Alert and oriented x3  o Mood and Affect  o : Mood and affect are appropriate to circumstances  · Extremities  o Extremities  o : No edema, no cyanosis          Assessment  · Esophageal dysphagia     787.20/R13.10  · Esophagitis, " Reflux     530.11/K21.0      Plan  · Medications  o Protonix 40 mg oral tablet,delayed release (DR/EC)   SIG: take 1 tablet by oral route 2 times a day for 30 days   DISP: (60) Tablet with 5 refills  Adjusted on 01/26/2021     o Medications have been Reconciled  · Instructions  o Information given on current diagnoses. Keep appointment for manometry.   · Disposition  o Follow up 2 months            Electronically Signed by: DELPHINE Painting -Author on January 28, 2021 01:25:01 PM

## 2021-05-15 VITALS
DIASTOLIC BLOOD PRESSURE: 87 MMHG | BODY MASS INDEX: 37.45 KG/M2 | SYSTOLIC BLOOD PRESSURE: 115 MMHG | TEMPERATURE: 97.8 F | WEIGHT: 219.37 LBS | HEIGHT: 64 IN

## 2021-05-15 VITALS
HEIGHT: 64 IN | DIASTOLIC BLOOD PRESSURE: 85 MMHG | SYSTOLIC BLOOD PRESSURE: 142 MMHG | WEIGHT: 221.31 LBS | TEMPERATURE: 98.8 F | BODY MASS INDEX: 37.78 KG/M2

## 2021-05-16 VITALS — BODY MASS INDEX: 35.68 KG/M2 | HEART RATE: 88 BPM | OXYGEN SATURATION: 98 % | WEIGHT: 209 LBS | HEIGHT: 64 IN

## 2021-07-08 ENCOUNTER — TRANSCRIBE ORDERS (OUTPATIENT)
Dept: ADMINISTRATIVE | Facility: HOSPITAL | Age: 46
End: 2021-07-08

## 2021-07-08 DIAGNOSIS — R42 DIZZINESS: Primary | ICD-10-CM

## 2022-03-22 ENCOUNTER — HOSPITAL ENCOUNTER (EMERGENCY)
Facility: HOSPITAL | Age: 47
Discharge: LEFT BEFORE DISCHARGE | End: 2022-03-22

## 2022-03-22 VITALS
HEART RATE: 101 BPM | TEMPERATURE: 98.6 F | SYSTOLIC BLOOD PRESSURE: 118 MMHG | RESPIRATION RATE: 18 BRPM | HEIGHT: 64 IN | WEIGHT: 221.56 LBS | DIASTOLIC BLOOD PRESSURE: 82 MMHG | OXYGEN SATURATION: 100 % | BODY MASS INDEX: 37.83 KG/M2

## 2022-03-22 LAB
ALBUMIN SERPL-MCNC: 4.5 G/DL (ref 3.5–5.2)
ALBUMIN/GLOB SERPL: 1.3 G/DL
ALP SERPL-CCNC: 89 U/L (ref 39–117)
ALT SERPL W P-5'-P-CCNC: 23 U/L (ref 1–33)
ANION GAP SERPL CALCULATED.3IONS-SCNC: 12.3 MMOL/L (ref 5–15)
AST SERPL-CCNC: 21 U/L (ref 1–32)
BASOPHILS # BLD AUTO: 0.03 10*3/MM3 (ref 0–0.2)
BASOPHILS NFR BLD AUTO: 0.3 % (ref 0–1.5)
BILIRUB SERPL-MCNC: 0.3 MG/DL (ref 0–1.2)
BUN SERPL-MCNC: 10 MG/DL (ref 6–20)
BUN/CREAT SERPL: 13 (ref 7–25)
CALCIUM SPEC-SCNC: 10.1 MG/DL (ref 8.6–10.5)
CHLORIDE SERPL-SCNC: 100 MMOL/L (ref 98–107)
CK MB SERPL-CCNC: 1.09 NG/ML
CK SERPL-CCNC: 33 U/L (ref 20–180)
CO2 SERPL-SCNC: 25.7 MMOL/L (ref 22–29)
CREAT SERPL-MCNC: 0.77 MG/DL (ref 0.57–1)
DEPRECATED RDW RBC AUTO: 44.2 FL (ref 37–54)
EGFRCR SERPLBLD CKD-EPI 2021: 97.1 ML/MIN/1.73
EOSINOPHIL # BLD AUTO: 0.06 10*3/MM3 (ref 0–0.4)
EOSINOPHIL NFR BLD AUTO: 0.7 % (ref 0.3–6.2)
ERYTHROCYTE [DISTWIDTH] IN BLOOD BY AUTOMATED COUNT: 13.2 % (ref 12.3–15.4)
GLOBULIN UR ELPH-MCNC: 3.5 GM/DL
GLUCOSE SERPL-MCNC: 113 MG/DL (ref 65–99)
HCT VFR BLD AUTO: 39.3 % (ref 34–46.6)
HGB BLD-MCNC: 13.1 G/DL (ref 12–15.9)
HOLD SPECIMEN: NORMAL
IMM GRANULOCYTES # BLD AUTO: 0.03 10*3/MM3 (ref 0–0.05)
IMM GRANULOCYTES NFR BLD AUTO: 0.3 % (ref 0–0.5)
LIPASE SERPL-CCNC: 49 U/L (ref 13–60)
LYMPHOCYTES # BLD AUTO: 2.08 10*3/MM3 (ref 0.7–3.1)
LYMPHOCYTES NFR BLD AUTO: 23.2 % (ref 19.6–45.3)
MAGNESIUM SERPL-MCNC: 1.9 MG/DL (ref 1.6–2.6)
MCH RBC QN AUTO: 30.8 PG (ref 26.6–33)
MCHC RBC AUTO-ENTMCNC: 33.3 G/DL (ref 31.5–35.7)
MCV RBC AUTO: 92.5 FL (ref 79–97)
MONOCYTES # BLD AUTO: 0.81 10*3/MM3 (ref 0.1–0.9)
MONOCYTES NFR BLD AUTO: 9.1 % (ref 5–12)
NEUTROPHILS NFR BLD AUTO: 5.94 10*3/MM3 (ref 1.7–7)
NEUTROPHILS NFR BLD AUTO: 66.4 % (ref 42.7–76)
NRBC BLD AUTO-RTO: 0 /100 WBC (ref 0–0.2)
NT-PROBNP SERPL-MCNC: 10.7 PG/ML (ref 0–450)
PLATELET # BLD AUTO: 480 10*3/MM3 (ref 140–450)
PMV BLD AUTO: 8.8 FL (ref 6–12)
POTASSIUM SERPL-SCNC: 3.3 MMOL/L (ref 3.5–5.2)
PROT SERPL-MCNC: 8 G/DL (ref 6–8.5)
RBC # BLD AUTO: 4.25 10*6/MM3 (ref 3.77–5.28)
SODIUM SERPL-SCNC: 138 MMOL/L (ref 136–145)
TROPONIN I SERPL-MCNC: 0.01 NG/ML (ref 0–0.6)
WBC NRBC COR # BLD: 8.95 10*3/MM3 (ref 3.4–10.8)
WHOLE BLOOD HOLD SPECIMEN: NORMAL
WHOLE BLOOD HOLD SPECIMEN: NORMAL

## 2022-03-22 PROCEDURE — 82553 CREATINE MB FRACTION: CPT

## 2022-03-22 PROCEDURE — 85025 COMPLETE CBC W/AUTO DIFF WBC: CPT

## 2022-03-22 PROCEDURE — 83735 ASSAY OF MAGNESIUM: CPT

## 2022-03-22 PROCEDURE — 93010 ELECTROCARDIOGRAM REPORT: CPT | Performed by: SPECIALIST

## 2022-03-22 PROCEDURE — 36415 COLL VENOUS BLD VENIPUNCTURE: CPT

## 2022-03-22 PROCEDURE — 93005 ELECTROCARDIOGRAM TRACING: CPT

## 2022-03-22 PROCEDURE — 80053 COMPREHEN METABOLIC PANEL: CPT

## 2022-03-22 PROCEDURE — 84484 ASSAY OF TROPONIN QUANT: CPT

## 2022-03-22 PROCEDURE — 83690 ASSAY OF LIPASE: CPT

## 2022-03-22 PROCEDURE — 82550 ASSAY OF CK (CPK): CPT

## 2022-03-22 PROCEDURE — 99211 OFF/OP EST MAY X REQ PHY/QHP: CPT

## 2022-03-22 PROCEDURE — 83880 ASSAY OF NATRIURETIC PEPTIDE: CPT

## 2022-03-22 RX ORDER — ASPIRIN 81 MG/1
324 TABLET, CHEWABLE ORAL ONCE
Status: DISCONTINUED | OUTPATIENT
Start: 2022-03-22 | End: 2022-03-22 | Stop reason: HOSPADM

## 2022-03-22 RX ORDER — SODIUM CHLORIDE 0.9 % (FLUSH) 0.9 %
10 SYRINGE (ML) INJECTION AS NEEDED
Status: DISCONTINUED | OUTPATIENT
Start: 2022-03-22 | End: 2022-03-22 | Stop reason: HOSPADM

## 2022-03-23 LAB — QT INTERVAL: 378 MS

## 2024-10-07 ENCOUNTER — APPOINTMENT (OUTPATIENT)
Dept: GENERAL RADIOLOGY | Facility: HOSPITAL | Age: 49
End: 2024-10-07
Payer: MEDICAID

## 2024-10-07 ENCOUNTER — HOSPITAL ENCOUNTER (EMERGENCY)
Facility: HOSPITAL | Age: 49
Discharge: HOME | End: 2024-10-07
Attending: EMERGENCY MEDICINE | Admitting: EMERGENCY MEDICINE
Payer: MEDICAID

## 2024-10-07 ENCOUNTER — APPOINTMENT (OUTPATIENT)
Dept: CT IMAGING | Facility: HOSPITAL | Age: 49
End: 2024-10-07
Payer: MEDICAID

## 2024-10-07 VITALS
OXYGEN SATURATION: 100 % | DIASTOLIC BLOOD PRESSURE: 88 MMHG | TEMPERATURE: 97.8 F | HEART RATE: 76 BPM | BODY MASS INDEX: 38.75 KG/M2 | RESPIRATION RATE: 18 BRPM | SYSTOLIC BLOOD PRESSURE: 140 MMHG | HEIGHT: 63 IN | WEIGHT: 218.7 LBS
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
## 2024-10-07 DIAGNOSIS — M50.30 DDD (DEGENERATIVE DISC DISEASE), CERVICAL: ICD-10-CM

## 2024-10-07 DIAGNOSIS — M51.369 DEGENERATION OF INTERVERTEBRAL DISC OF LUMBAR REGION, UNSPECIFIED WHETHER PAIN PRESENT: ICD-10-CM

## 2024-10-07 DIAGNOSIS — M51.34 DDD (DEGENERATIVE DISC DISEASE), THORACIC: ICD-10-CM

## 2024-10-07 DIAGNOSIS — M19.012 ARTHRITIS OF LEFT ACROMIOCLAVICULAR JOINT: ICD-10-CM

## 2024-10-07 PROCEDURE — 99284 EMERGENCY DEPT VISIT MOD MDM: CPT

## 2024-10-07 PROCEDURE — 72125 CT NECK SPINE W/O DYE: CPT

## 2024-10-07 PROCEDURE — 72072 X-RAY EXAM THORAC SPINE 3VWS: CPT

## 2024-10-07 PROCEDURE — 73030 X-RAY EXAM OF SHOULDER: CPT

## 2024-10-07 PROCEDURE — 72100 X-RAY EXAM L-S SPINE 2/3 VWS: CPT

## 2024-10-07 RX ORDER — CYCLOBENZAPRINE HCL 10 MG
10 TABLET ORAL ONCE
Status: COMPLETED | OUTPATIENT
Start: 2024-10-07 | End: 2024-10-07

## 2024-10-07 RX ORDER — CYCLOBENZAPRINE HCL 10 MG
10 TABLET ORAL 3 TIMES DAILY
Qty: 20 TABLET | Refills: 0 | Status: SHIPPED | OUTPATIENT
Start: 2024-10-07

## 2024-10-07 RX ORDER — IBUPROFEN 400 MG/1
800 TABLET, FILM COATED ORAL ONCE
Status: COMPLETED | OUTPATIENT
Start: 2024-10-07 | End: 2024-10-07

## 2024-10-07 RX ADMIN — IBUPROFEN 800 MG: 400 TABLET ORAL at 16:16

## 2024-10-07 RX ADMIN — CYCLOBENZAPRINE HYDROCHLORIDE 10 MG: 10 TABLET, FILM COATED ORAL at 16:16

## 2024-10-07 NOTE — Clinical Note
[facility] EMERGENCY ROOM  [address] [place] [address]  Phone: [phone]  Fax: [phone]    Kerry Foster was seen and treated in our emergency department on 10/7/2024.  She may return to work on 10/07/2024.  Accompanied patient to emergency department. Patient discharged from emergency department at 5/07/24.       Thank you for choosing [facility].    Cady Cummins MD
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
## 2024-10-07 NOTE — Clinical Note
[facility] EMERGENCY ROOM  [address] [address]  Phone: [phone]  Fax: [phone]    Kerry Foster was seen and treated in our emergency department on 10/7/2024.  She may return to work on 10/07/2024.      Family member accompanied patient to emergency department. Patient discharged from emergency department at 5/07/24.       Thank you for choosing [facility].    Perla Cortes RN

## 2024-10-07 NOTE — DISCHARGE INSTRUCTIONS
X-rays and CT negative for any fractures or dislocations.  Take Tylenol/Motrin as needed for pain absent muscle relaxers to pharmacy.
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
## 2024-10-16 ENCOUNTER — HOSPITAL ENCOUNTER (INPATIENT)
Facility: HOSPITAL | Age: 49
LOS: 5 days | Discharge: HOME | DRG: 882 | End: 2024-10-21
Attending: PSYCHIATRY & NEUROLOGY | Admitting: PSYCHIATRY & NEUROLOGY
Payer: MEDICAID

## 2024-10-16 ENCOUNTER — HOSPITAL ENCOUNTER (EMERGENCY)
Facility: HOSPITAL | Age: 49
Discharge: PSYCHIATRIC OR SUBSTANCE USE FACILITY | DRG: 882 | End: 2024-10-16
Attending: EMERGENCY MEDICINE | Admitting: EMERGENCY MEDICINE
Payer: MEDICAID

## 2024-10-16 VITALS
BODY MASS INDEX: 39.88 KG/M2 | WEIGHT: 225.1 LBS | OXYGEN SATURATION: 95 % | DIASTOLIC BLOOD PRESSURE: 80 MMHG | TEMPERATURE: 97.8 F | RESPIRATION RATE: 18 BRPM | HEIGHT: 63 IN | SYSTOLIC BLOOD PRESSURE: 132 MMHG | HEART RATE: 89 BPM

## 2024-10-16 DIAGNOSIS — T14.91XA SUICIDE ATTEMPT: Primary | ICD-10-CM

## 2024-10-16 PROBLEM — F43.25 ADJUSTMENT DISORDER WITH MIXED DISTURBANCE OF EMOTIONS AND CONDUCT: Status: ACTIVE | Noted: 2024-10-16

## 2024-10-16 LAB
ALBUMIN SERPL-MCNC: 4.4 G/DL (ref 3.5–5.2)
ALBUMIN/GLOB SERPL: 1.1 G/DL
ALP SERPL-CCNC: 91 U/L (ref 39–117)
ALT SERPL W P-5'-P-CCNC: 30 U/L (ref 1–33)
AMPHET+METHAMPHET UR QL: NEGATIVE
AMPHETAMINES UR QL: NEGATIVE
ANION GAP SERPL CALCULATED.3IONS-SCNC: 10.9 MMOL/L (ref 5–15)
APAP SERPL-MCNC: <5 MCG/ML (ref 0–30)
AST SERPL-CCNC: 27 U/L (ref 1–32)
BARBITURATES UR QL SCN: NEGATIVE
BASOPHILS # BLD AUTO: 0.04 10*3/MM3 (ref 0–0.2)
BASOPHILS NFR BLD AUTO: 0.6 % (ref 0–1.5)
BENZODIAZ UR QL SCN: NEGATIVE
BILIRUB SERPL-MCNC: 0.3 MG/DL (ref 0–1.2)
BUN SERPL-MCNC: 9 MG/DL (ref 6–20)
BUN/CREAT SERPL: 11 (ref 7–25)
BUPRENORPHINE SERPL-MCNC: NEGATIVE NG/ML
CALCIUM SPEC-SCNC: 9.8 MG/DL (ref 8.6–10.5)
CANNABINOIDS SERPL QL: NEGATIVE
CHLORIDE SERPL-SCNC: 106 MMOL/L (ref 98–107)
CO2 SERPL-SCNC: 25.1 MMOL/L (ref 22–29)
COCAINE UR QL: NEGATIVE
CREAT SERPL-MCNC: 0.82 MG/DL (ref 0.57–1)
DEPRECATED RDW RBC AUTO: 42.5 FL (ref 37–54)
EGFRCR SERPLBLD CKD-EPI 2021: 88.4 ML/MIN/1.73
EOSINOPHIL # BLD AUTO: 0.08 10*3/MM3 (ref 0–0.4)
EOSINOPHIL NFR BLD AUTO: 1.1 % (ref 0.3–6.2)
ERYTHROCYTE [DISTWIDTH] IN BLOOD BY AUTOMATED COUNT: 12.6 % (ref 12.3–15.4)
ETHANOL BLD-MCNC: <10 MG/DL (ref 0–10)
ETHANOL UR QL: <0.01 %
FENTANYL UR-MCNC: NEGATIVE NG/ML
GLOBULIN UR ELPH-MCNC: 4.1 GM/DL
GLUCOSE BLDC GLUCOMTR-MCNC: 96 MG/DL (ref 70–99)
GLUCOSE SERPL-MCNC: 107 MG/DL (ref 65–99)
HCT VFR BLD AUTO: 41.2 % (ref 34–46.6)
HGB BLD-MCNC: 13.7 G/DL (ref 12–15.9)
HOLD SPECIMEN: NORMAL
HOLD SPECIMEN: NORMAL
IMM GRANULOCYTES # BLD AUTO: 0.04 10*3/MM3 (ref 0–0.05)
IMM GRANULOCYTES NFR BLD AUTO: 0.6 % (ref 0–0.5)
LYMPHOCYTES # BLD AUTO: 1.92 10*3/MM3 (ref 0.7–3.1)
LYMPHOCYTES NFR BLD AUTO: 27.3 % (ref 19.6–45.3)
MAGNESIUM SERPL-MCNC: 2.3 MG/DL (ref 1.6–2.6)
MCH RBC QN AUTO: 30.5 PG (ref 26.6–33)
MCHC RBC AUTO-ENTMCNC: 33.3 G/DL (ref 31.5–35.7)
MCV RBC AUTO: 91.8 FL (ref 79–97)
METHADONE UR QL SCN: NEGATIVE
MONOCYTES # BLD AUTO: 0.56 10*3/MM3 (ref 0.1–0.9)
MONOCYTES NFR BLD AUTO: 8 % (ref 5–12)
NEUTROPHILS NFR BLD AUTO: 4.39 10*3/MM3 (ref 1.7–7)
NEUTROPHILS NFR BLD AUTO: 62.4 % (ref 42.7–76)
NRBC BLD AUTO-RTO: 0 /100 WBC (ref 0–0.2)
OPIATES UR QL: NEGATIVE
OXYCODONE UR QL SCN: NEGATIVE
PCP UR QL SCN: NEGATIVE
PLATELET # BLD AUTO: 451 10*3/MM3 (ref 140–450)
PMV BLD AUTO: 9.3 FL (ref 6–12)
POTASSIUM SERPL-SCNC: 3.5 MMOL/L (ref 3.5–5.2)
PROT SERPL-MCNC: 8.5 G/DL (ref 6–8.5)
QT INTERVAL: 400 MS
QTC INTERVAL: 456 MS
RBC # BLD AUTO: 4.49 10*6/MM3 (ref 3.77–5.28)
SALICYLATES SERPL-MCNC: <0.3 MG/DL
SODIUM SERPL-SCNC: 142 MMOL/L (ref 136–145)
TRICYCLICS UR QL SCN: NEGATIVE
WBC NRBC COR # BLD AUTO: 7.03 10*3/MM3 (ref 3.4–10.8)
WHOLE BLOOD HOLD COAG: NORMAL
WHOLE BLOOD HOLD SPECIMEN: NORMAL

## 2024-10-16 PROCEDURE — 96374 THER/PROPH/DIAG INJ IV PUSH: CPT

## 2024-10-16 PROCEDURE — 93005 ELECTROCARDIOGRAM TRACING: CPT | Performed by: EMERGENCY MEDICINE

## 2024-10-16 PROCEDURE — 80143 DRUG ASSAY ACETAMINOPHEN: CPT | Performed by: EMERGENCY MEDICINE

## 2024-10-16 PROCEDURE — 82077 ASSAY SPEC XCP UR&BREATH IA: CPT | Performed by: EMERGENCY MEDICINE

## 2024-10-16 PROCEDURE — 80307 DRUG TEST PRSMV CHEM ANLYZR: CPT | Performed by: EMERGENCY MEDICINE

## 2024-10-16 PROCEDURE — 80179 DRUG ASSAY SALICYLATE: CPT | Performed by: EMERGENCY MEDICINE

## 2024-10-16 PROCEDURE — 83735 ASSAY OF MAGNESIUM: CPT | Performed by: EMERGENCY MEDICINE

## 2024-10-16 PROCEDURE — 84443 ASSAY THYROID STIM HORMONE: CPT | Performed by: PSYCHIATRY & NEUROLOGY

## 2024-10-16 PROCEDURE — 93010 ELECTROCARDIOGRAM REPORT: CPT | Performed by: INTERNAL MEDICINE

## 2024-10-16 PROCEDURE — 85025 COMPLETE CBC W/AUTO DIFF WBC: CPT | Performed by: EMERGENCY MEDICINE

## 2024-10-16 PROCEDURE — 99285 EMERGENCY DEPT VISIT HI MDM: CPT

## 2024-10-16 PROCEDURE — 82948 REAGENT STRIP/BLOOD GLUCOSE: CPT

## 2024-10-16 PROCEDURE — 25010000002 ONDANSETRON PER 1 MG: Performed by: EMERGENCY MEDICINE

## 2024-10-16 PROCEDURE — 84439 ASSAY OF FREE THYROXINE: CPT | Performed by: PSYCHIATRY & NEUROLOGY

## 2024-10-16 PROCEDURE — 80053 COMPREHEN METABOLIC PANEL: CPT | Performed by: EMERGENCY MEDICINE

## 2024-10-16 RX ORDER — HYDROXYZINE HYDROCHLORIDE 25 MG/1
50 TABLET, FILM COATED ORAL EVERY 6 HOURS PRN
Status: DISCONTINUED | OUTPATIENT
Start: 2024-10-16 | End: 2024-10-21 | Stop reason: HOSPADM

## 2024-10-16 RX ORDER — LORAZEPAM 2 MG/1
2 TABLET ORAL EVERY 4 HOURS PRN
Status: DISCONTINUED | OUTPATIENT
Start: 2024-10-16 | End: 2024-10-21 | Stop reason: HOSPADM

## 2024-10-16 RX ORDER — DIPHENHYDRAMINE HYDROCHLORIDE 50 MG/ML
50 INJECTION INTRAMUSCULAR; INTRAVENOUS EVERY 4 HOURS PRN
Status: DISCONTINUED | OUTPATIENT
Start: 2024-10-16 | End: 2024-10-21 | Stop reason: HOSPADM

## 2024-10-16 RX ORDER — DIPHENHYDRAMINE HYDROCHLORIDE 50 MG/ML
50 INJECTION INTRAMUSCULAR; INTRAVENOUS EVERY 4 HOURS PRN
Status: DISCONTINUED | OUTPATIENT
Start: 2024-10-16 | End: 2024-10-16

## 2024-10-16 RX ORDER — LOPERAMIDE HCL 2 MG
2 CAPSULE ORAL
Status: DISCONTINUED | OUTPATIENT
Start: 2024-10-16 | End: 2024-10-21 | Stop reason: HOSPADM

## 2024-10-16 RX ORDER — ONDANSETRON 2 MG/ML
4 INJECTION INTRAMUSCULAR; INTRAVENOUS ONCE
Status: COMPLETED | OUTPATIENT
Start: 2024-10-16 | End: 2024-10-16

## 2024-10-16 RX ORDER — DIPHENHYDRAMINE HCL 50 MG
50 CAPSULE ORAL EVERY 4 HOURS PRN
Status: DISCONTINUED | OUTPATIENT
Start: 2024-10-16 | End: 2024-10-21 | Stop reason: HOSPADM

## 2024-10-16 RX ORDER — ACETAMINOPHEN 325 MG/1
650 TABLET ORAL EVERY 6 HOURS PRN
Status: DISCONTINUED | OUTPATIENT
Start: 2024-10-16 | End: 2024-10-21 | Stop reason: HOSPADM

## 2024-10-16 RX ORDER — TRAZODONE HYDROCHLORIDE 50 MG/1
50 TABLET, FILM COATED ORAL NIGHTLY PRN
Status: DISCONTINUED | OUTPATIENT
Start: 2024-10-16 | End: 2024-10-21 | Stop reason: HOSPADM

## 2024-10-16 RX ORDER — HALOPERIDOL 5 MG/ML
5 INJECTION INTRAMUSCULAR EVERY 4 HOURS PRN
Status: DISCONTINUED | OUTPATIENT
Start: 2024-10-16 | End: 2024-10-21 | Stop reason: HOSPADM

## 2024-10-16 RX ORDER — NICOTINE 21 MG/24HR
1 PATCH, TRANSDERMAL 24 HOURS TRANSDERMAL DAILY PRN
Status: DISCONTINUED | OUTPATIENT
Start: 2024-10-16 | End: 2024-10-21 | Stop reason: HOSPADM

## 2024-10-16 RX ORDER — HALOPERIDOL 5 MG/ML
5 INJECTION INTRAMUSCULAR EVERY 4 HOURS PRN
Status: DISCONTINUED | OUTPATIENT
Start: 2024-10-16 | End: 2024-10-16

## 2024-10-16 RX ORDER — SODIUM CHLORIDE 0.9 % (FLUSH) 0.9 %
10 SYRINGE (ML) INJECTION AS NEEDED
Status: DISCONTINUED | OUTPATIENT
Start: 2024-10-16 | End: 2024-10-16 | Stop reason: HOSPADM

## 2024-10-16 RX ORDER — LORAZEPAM 2 MG/ML
2 INJECTION INTRAMUSCULAR EVERY 4 HOURS PRN
Status: DISCONTINUED | OUTPATIENT
Start: 2024-10-16 | End: 2024-10-21 | Stop reason: HOSPADM

## 2024-10-16 RX ORDER — LORAZEPAM 2 MG/ML
2 INJECTION INTRAMUSCULAR EVERY 4 HOURS PRN
Status: DISCONTINUED | OUTPATIENT
Start: 2024-10-16 | End: 2024-10-16

## 2024-10-16 RX ORDER — LISINOPRIL 10 MG/1
10 TABLET ORAL DAILY
COMMUNITY
Start: 2024-10-10

## 2024-10-16 RX ORDER — HALOPERIDOL 5 MG/1
5 TABLET ORAL EVERY 4 HOURS PRN
Status: DISCONTINUED | OUTPATIENT
Start: 2024-10-16 | End: 2024-10-21 | Stop reason: HOSPADM

## 2024-10-16 RX ORDER — ALUMINA, MAGNESIA, AND SIMETHICONE 2400; 2400; 240 MG/30ML; MG/30ML; MG/30ML
15 SUSPENSION ORAL EVERY 6 HOURS PRN
Status: DISCONTINUED | OUTPATIENT
Start: 2024-10-16 | End: 2024-10-21 | Stop reason: HOSPADM

## 2024-10-16 RX ADMIN — ONDANSETRON 4 MG: 2 INJECTION, SOLUTION INTRAMUSCULAR; INTRAVENOUS at 15:03

## 2024-10-16 NOTE — ED NOTES
Contacted poison control and spoke with Natalie Vela about pt's condition. She recommended EKG, Cardiac monitor, CMP, IVF's as needed, observe for 6-8 hours longer if needed and treat symptoms.
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
## 2024-10-16 NOTE — ED NOTES
This RN spoke with Natalie from poison control to give update. She recommends continuing monitoring patient for 6-8 hours from patient arrival time.

## 2024-10-16 NOTE — ED PROVIDER NOTES
"Time: 11:53 AM EDT  Date of encounter:  10/16/2024  Independent Historian/Clinical History and Information was obtained by:   Patient and EMS    History is limited by: Altered Mental Status    Chief Complaint: Altered mental status, presumed overdose      History of Present Illness:  Patient is a 49 y.o. year old female who presents to the emergency department for evaluation of altered mental status with presumed overdose.  Per EMS, patient found in her bed at home with multiple empty pill bottles, including Zoloft, Xanax and lisinopril.  EMS states that the patient's  reportedly  1 month ago.  On arrival the patient is not providing any history, saying \"leave me alone\".      Patient Care Team  Primary Care Provider: Catrachita Rojas APRN    Past Medical History:     Allergies   Allergen Reactions    Codeine Hives    Morphine Anaphylaxis     Past Medical History:   Diagnosis Date    Arthritis     Breast pain     HTN (hypertension)     Overdose     Vaginal yeast infection      Past Surgical History:   Procedure Laterality Date    ANTERIOR CERVICAL FUSION      BREAST BIOPSY      GALLBLADDER SURGERY      HYSTERECTOMY      LASER ABLATION      TUBAL ABDOMINAL LIGATION       Family History   Problem Relation Age of Onset    Lung cancer Mother     Cancer Brother         ORAL/MOUTH    Breast cancer Maternal Aunt         UNKNOWN AGE     Cancer Paternal Uncle         UNKNOWN ORGIN     Breast cancer Maternal Grandmother         70S    Pancreatic cancer Maternal Grandfather         UNKNOWN AGE     Breast cancer Paternal Grandmother         UNKNOWN AGE     Breast cancer Cousin         30S    Breast cancer Cousin         30S    Cancer Brother         BONE    Lung cancer Maternal Aunt     Canavan disease Maternal Aunt     Cancer Maternal Aunt         BLADDER       Home Medications:  Prior to Admission medications    Medication Sig Start Date End Date Taking? Authorizing Provider   ALPRAZolam (XANAX) 0.5 MG tablet  " "8/31/24   ProviderLilia MD   cyclobenzaprine (FLEXERIL) 10 MG tablet Take 1 tablet by mouth 3 (Three) Times a Day. 10/7/24   Melinda Becerra PA-C   DULoxetine (CYMBALTA) 60 MG capsule Take 60 mg by mouth. 3/18/22 3/18/23  Emergency, Nurse Radha RN   levothyroxine (SYNTHROID, LEVOTHROID) 50 MCG tablet Take 50 mcg by mouth Daily. 12/20/21 12/20/22  Emergency, Nurse Radha RN   meloxicam (MOBIC) 15 MG tablet Take 1 tablet by mouth Daily. 7/1/24   Lilia High MD   mupirocin (BACTROBAN) 2 % ointment Apply small amount inside each nostril 2x daily for the 5 days prior to surgery.. 2/4/22   Emergency, Nurse Radha RN   ondansetron ODT (ZOFRAN-ODT) 4 MG disintegrating tablet Place 1 tablet on the tongue Every 8 (Eight) Hours As Needed for Nausea or Vomiting. 10/2/24   Rocío Patel APRN   propranolol (INDERAL) 20 MG tablet Take 20 mg by mouth. 3/22/22   Emergency, Nurse Radha RN   sertraline (ZOLOFT) 50 MG tablet Take 1 tablet by mouth Daily. 7/17/24 7/17/25  ProviderLilia MD   traMADol (ULTRAM) 50 MG tablet Take 1 tablet by mouth. 3/1/22   Emergency, Nurse Garcia RN        Social History:   Social History     Tobacco Use    Smoking status: Never     Passive exposure: Never    Smokeless tobacco: Never   Vaping Use    Vaping status: Never Used   Substance Use Topics    Alcohol use: Yes     Comment: OCC    Drug use: No         Review of Systems:  Review of Systems   Unable to perform ROS: Psychiatric disorder        Physical Exam:  /74   Pulse 84   Temp 97.8 °F (36.6 °C) (Oral)   Resp 18   Ht 160 cm (63\")   Wt 102 kg (225 lb 1.6 oz)   LMP  (LMP Unknown)   SpO2 96%   BMI 39.87 kg/m²     Physical Exam  Vitals and nursing note reviewed.   Constitutional:       General: She is awake.      Appearance: Normal appearance.      Comments: Appears alert, however holding her eyes closed.  Does respond to verbal stimuli.   HENT:      Head: Normocephalic and atraumatic.      Nose: Nose " normal.      Mouth/Throat:      Mouth: Mucous membranes are moist.   Eyes:      Extraocular Movements: Extraocular movements intact.      Pupils: Pupils are equal, round, and reactive to light.   Cardiovascular:      Rate and Rhythm: Normal rate and regular rhythm.      Heart sounds: Normal heart sounds.   Pulmonary:      Effort: Pulmonary effort is normal. No respiratory distress.      Breath sounds: Normal breath sounds. No wheezing, rhonchi or rales.   Abdominal:      General: Bowel sounds are normal.      Palpations: Abdomen is soft.      Tenderness: There is no abdominal tenderness. There is no guarding or rebound.      Comments: No rigidity   Musculoskeletal:         General: No tenderness. Normal range of motion.      Cervical back: Normal range of motion and neck supple.   Skin:     General: Skin is warm and dry.      Coloration: Skin is not jaundiced.   Neurological:      General: No focal deficit present.   Psychiatric:      Comments: Flat affect                  Procedures:  Procedures      Medical Decision Making:      Comorbidities that affect care:    Hypertension    External Notes reviewed:    Previous Clinic Note: Family medicine office visit 7/17/2024.  Description: Unspecified dysphagia      The following orders were placed and all results were independently analyzed by me:  Orders Placed This Encounter   Procedures    Springer Draw    Comprehensive Metabolic Panel    Acetaminophen Level    Ethanol    Salicylate Level    Urine Drug Screen - Urine, Clean Catch    CBC Auto Differential    Magnesium    Fentanyl, Urine - Urine, Clean Catch    NPO Diet NPO Type: Strict NPO    Contact poison control    Continuous Pulse Oximetry    Vital Signs    Undress & Gown    Psych / Access to See    IP General Consult (Use specialty-specific consult if known)    Oxygen Therapy- Nasal Cannula; Titrate 1-6 LPM Per SpO2; 90 - 95%    POC Glucose Once    POC Glucose Once    ECG 12 Lead Drug Monitoring    Insert Peripheral  "IV    Legal Status 72 Hour Hold    Suicide Precautions    CBC & Differential    Green Top (Gel)    Lavender Top    Gold Top - SST    Light Blue Top       Medications Given in the Emergency Department:  Medications   sodium chloride 0.9 % flush 10 mL (has no administration in time range)   ondansetron (ZOFRAN) injection 4 mg (4 mg Intravenous Given 10/16/24 1503)        ED Course:    ED Course as of 10/16/24 1849   Wed Oct 16, 2024   1449 I have personally interpreted the EKG today and it shows no evidence of any acute ischemia or heart arrhythmia. [RP]   1559 Reevaluation: Patient not lethargic.  She is answering questions and does admit this was a suicide attempt.  Patient states she took \"a lot\" of the 3 listed medications.  Her current blood pressure is 123/56 and thankfully does not appear to be trending down rapidly. [RP]   1848 Current blood pressure 119/87. [RP]      ED Course User Index  [RP] Memo Peña MD       Labs:    Lab Results (last 24 hours)       Procedure Component Value Units Date/Time    POC Glucose Once [[id number]]  (Normal) Collected: 10/16/24 1152    Specimen: Blood Updated: 10/16/24 1250     Glucose 96 mg/dL      Comment: Serial Number: [id number]:  [id number]       CBC & Differential [[id number]]  (Abnormal) Collected: 10/16/24 1204    Specimen: Blood Updated: 10/16/24 1209    Narrative:      The following orders were created for panel order CBC & Differential.  Procedure                               Abnormality         Status                     ---------                               -----------         ------                     CBC Auto Differential[[id number]]        Abnormal            Final result                 Please view results for these tests on the individual orders.    Comprehensive Metabolic Panel [[id number]]  (Abnormal) Collected: 10/16/24 1204    Specimen: Blood Updated: 10/16/24 1234     Glucose 107 mg/dL      BUN 9 mg/dL      Creatinine 0.82 mg/dL      Sodium " 142 mmol/L      Potassium 3.5 mmol/L      Chloride 106 mmol/L      CO2 25.1 mmol/L      Calcium 9.8 mg/dL      Total Protein 8.5 g/dL      Albumin 4.4 g/dL      ALT (SGPT) 30 U/L      AST (SGOT) 27 U/L      Alkaline Phosphatase 91 U/L      Total Bilirubin 0.3 mg/dL      Globulin 4.1 gm/dL      A/G Ratio 1.1 g/dL      BUN/Creatinine Ratio 11.0     Anion Gap 10.9 mmol/L      eGFR 88.4 mL/min/1.73     Narrative:      GFR Normal >60  Chronic Kidney Disease <60  Kidney Failure <15      Acetaminophen Level [[id number]]  (Normal) Collected: 10/16/24 1204    Specimen: Blood Updated: 10/16/24 1234     Acetaminophen <5.0 mcg/mL     Ethanol [[id number]] Collected: 10/16/24 1204    Specimen: Blood Updated: 10/16/24 1234     Ethanol <10 mg/dL      Ethanol % <0.010 %     Narrative:      Ethanol (Plasma)  <10 Essentially Negative    Toxic Concentrations           mg/dL    Flushing, slowing of reflexes    Impaired visual activity         Depression of CNS              >100  Possible Coma                  >300       Salicylate Level [[id number]]  (Normal) Collected: 10/16/24 1204    Specimen: Blood Updated: 10/16/24 1234     Salicylate <0.3 mg/dL     CBC Auto Differential [[id number]]  (Abnormal) Collected: 10/16/24 1204    Specimen: Blood Updated: 10/16/24 1209     WBC 7.03 10*3/mm3      RBC 4.49 10*6/mm3      Hemoglobin 13.7 g/dL      Hematocrit 41.2 %      MCV 91.8 fL      MCH 30.5 pg      MCHC 33.3 g/dL      RDW 12.6 %      RDW-SD 42.5 fl      MPV 9.3 fL      Platelets 451 10*3/mm3      Neutrophil % 62.4 %      Lymphocyte % 27.3 %      Monocyte % 8.0 %      Eosinophil % 1.1 %      Basophil % 0.6 %      Immature Grans % 0.6 %      Neutrophils, Absolute 4.39 10*3/mm3      Lymphocytes, Absolute 1.92 10*3/mm3      Monocytes, Absolute 0.56 10*3/mm3      Eosinophils, Absolute 0.08 10*3/mm3      Basophils, Absolute 0.04 10*3/mm3      Immature Grans, Absolute 0.04 10*3/mm3      nRBC 0.0 /100 WBC     Magnesium [[id number]]   (Normal) Collected: 10/16/24 1204    Specimen: Blood Updated: 10/16/24 1241     Magnesium 2.3 mg/dL     Urine Drug Screen - Urine, Clean Catch [[id number]]  (Normal) Collected: 10/16/24 1239    Specimen: Urine, Clean Catch Updated: 10/16/24 1318     THC, Screen, Urine Negative     Phencyclidine (PCP), Urine Negative     Cocaine Screen, Urine Negative     Methamphetamine, Ur Negative     Opiate Screen Negative     Amphetamine Screen, Urine Negative     Benzodiazepine Screen, Urine Negative     Tricyclic Antidepressants Screen Negative     Methadone Screen, Urine Negative     Barbiturates Screen, Urine Negative     Oxycodone Screen, Urine Negative     Buprenorphine, Screen, Urine Negative    Narrative:      Cutoff For Drugs Screened:    Amphetamines               500 ng/ml  Barbiturates               200 ng/ml  Benzodiazepines            150 ng/ml  Cocaine                    150 ng/ml  Methadone                  200 ng/ml  Opiates                    100 ng/ml  Phencyclidine               25 ng/ml  THC                         50 ng/ml  Methamphetamine            500 ng/ml  Tricyclic Antidepressants  300 ng/ml  Oxycodone                  100 ng/ml  Buprenorphine               10 ng/ml    The normal value for all drugs tested is negative. This report includes unconfirmed screening results, with the cutoff values listed, to be used for medical treatment purposes only.  Unconfirmed results must not be used for non-medical purposes such as employment or legal testing.  Clinical consideration should be applied to any drug of abuse test, particularly when unconfirmed results are used.      Fentanyl, Urine - Urine, Clean Catch [[id number]]  (Normal) Collected: 10/16/24 1239    Specimen: Urine, Clean Catch Updated: 10/16/24 1324     Fentanyl, Urine Negative    Narrative:      Negative Threshold:      Fentanyl 5 ng/mL     The normal value for the drug tested is negative. This report includes final unconfirmed screening results to  be used for medical treatment purposes only. Unconfirmed results must not be used for non-medical purposes such as employment or legal testing. Clinical consideration should be applied to any drug of abuse test, particularly when unconfirmed results are used.                    Imaging:    No Radiology Exams Resulted Within Past 24 Hours      Differential Diagnosis and Discussion:    Altered Mental Status: Based on the patient's signs and symptoms, differential diagnosis includes but is not limited to meningitis, stroke, sepsis, subarachnoid hemorrhage, intracranial bleeding, encephalitis, and metabolic encephalopathy.  Psychiatric: Differential diagnosis includes but is not limited to depression, psychosis, bipolar disorder, anxiety, manic episode, schizophrenia, and substance abuse.    All labs were reviewed and interpreted by me.  EKG was interpreted by me.    MDM                     Patient Care Considerations:    CT HEAD: I considered ordering a noncontrast CT of the head, however there is no reported head trauma.      Consultants/Shared Management Plan:    Case discussed with psychiatry Dr. Murrell who agrees to accept the patient for [facility] admission.    Social Determinants of Health:    Patient is independent, reliable, and has access to care.       Disposition and Care Coordination:    Psychiatric Admission: Through independent evaluation of the patient's history and physical and consultation with psychiatry, the patient meets criteria for admission to a psychiatric facility.        Final diagnoses:   Suicide attempt        ED Disposition       ED Disposition   DC/Transfer to Behavioral Health Condition   Stable    Comment   --               This medical record created using voice recognition software.             Memo Peña MD  10/16/24 9253

## 2024-10-17 PROBLEM — E03.9 HYPOTHYROIDISM: Status: ACTIVE | Noted: 2021-12-17

## 2024-10-17 PROBLEM — F33.1 MAJOR DEPRESSIVE DISORDER, RECURRENT EPISODE, MODERATE: Status: ACTIVE | Noted: 2024-10-17

## 2024-10-17 PROBLEM — E66.812 CLASS 2 OBESITY IN ADULT: Status: ACTIVE | Noted: 2024-10-17

## 2024-10-17 PROBLEM — I10 ESSENTIAL HYPERTENSION: Status: ACTIVE | Noted: 2021-12-17

## 2024-10-17 LAB
T4 FREE SERPL-MCNC: 0.95 NG/DL (ref 0.92–1.68)
TSH SERPL DL<=0.05 MIU/L-ACNC: 17.36 UIU/ML (ref 0.27–4.2)

## 2024-10-17 RX ORDER — VENLAFAXINE HYDROCHLORIDE 75 MG/1
75 CAPSULE, EXTENDED RELEASE ORAL
Status: DISCONTINUED | OUTPATIENT
Start: 2024-10-17 | End: 2024-10-21 | Stop reason: HOSPADM

## 2024-10-17 RX ORDER — LISINOPRIL 10 MG/1
10 TABLET ORAL DAILY
Status: DISCONTINUED | OUTPATIENT
Start: 2024-10-17 | End: 2024-10-21 | Stop reason: HOSPADM

## 2024-10-17 RX ADMIN — VENLAFAXINE HYDROCHLORIDE 75 MG: 75 CAPSULE, EXTENDED RELEASE ORAL at 13:16
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
## 2024-10-17 NOTE — PLAN OF CARE
Goal Outcome Evaluation:   Progress: no change.  Patient newly arrived to unit.  Patient has been assessed and plan of care initiated based on needs, goals, and treatment.  Patient participated in assessments and was encouraged to make needs known to staff. Patient verbalized understanding. Supportive care and safe environment provided. See Admission Note.

## 2024-10-17 NOTE — NURSING NOTE
Patient arrived to the unit at  pm, this 49 year old female, admitted as an Involuntary 72 hour physician's hold, with a diagnosis of Adjustment Disorder with Mixed Disturbance of Emotions and Conduct. Patient arrived via wheelchair, accompanied by 2 [facility] security staff and an ED closewatch tech. Patient requires 2 assist with ambulation and transferring and reports recent falls, and a Falls risk plan of care has been initiated, with falls precautions.  Patient presents as quiet, and sad with a depressed affect and is cooperative with admission processing, assessments and contraband search.  Patient has on a necklace with her recently  's wedding ring, and refuses to remove it.  Patient does contract for safety and denies any further plan or intent to harm herself at this time.  Patient reports she was increasingly depressed and hopeless at home, and took an overdose of her home medications with intent to kill herself, a 1:1 staff member is at bedside due to high risk for suicide, observation is continuous.  Patient states she has grief issues following the death of her  just 1 month ago, and grief issues have only increased since his burial.  Patient states her  was diagnosed with Prion Disease, and a week later he passed away, and she is feeling devastated.  A  visit was offered to patient and she accepted, consult has been entered.  Patient reports her family medical doctor has treated her for depression and anxiety in the past, and she denies any previous suicide attempts or ideations.     Patient report she lives with her Dad and daughter and has placed her mother and father on her Release of Information.  Patient reports she works at [employer] in [place].  Patient was educated on falls prevention and suicide precautions and advised that she may still need to surrender her necklace for safety purposes.  Patient was encouraged to make needs known.   Patient was provided with a snack and fluids, and used patient phone to make phone calls to family.  1:1 closewatch at bedside. Emotional support as needed and safe environment provided.

## 2024-10-17 NOTE — PLAN OF CARE
"Goal Outcome Evaluation:  Plan of Care Reviewed With: patient  Patient Agreement with Plan of Care: agrees with comment (describe)         Nursing Note:  Received pt this morning with report that pt is a falls risk.  Pt placed on safety watch related to high falls risk.  Pt reports she is dizzy and shaky.  Dr. Urbina here and discontinued close watch.  Pt denies current SI; however, states \"I wish I wouldn't have been found\".  Rates her anxiety a 10/10, depression an 8/10.  Pt denies current SI, HI, or Halllucinations.  Has been compliant with medications.  States she was  to her  spouse three years.  Pt was given handouts on Depression, anxiety, managing stress, and grief with encouragement to review this material.  Will continue to monitor mood and behavior.  Treatment plan is ongoing.                               "

## 2024-10-18 RX ORDER — ARIPIPRAZOLE 5 MG/1
5 TABLET ORAL DAILY
Status: DISCONTINUED | OUTPATIENT
Start: 2024-10-18 | End: 2024-10-21 | Stop reason: HOSPADM

## 2024-10-18 RX ADMIN — TRAZODONE HYDROCHLORIDE 50 MG: 50 TABLET ORAL at 20:36

## 2024-10-18 RX ADMIN — LEVOTHYROXINE SODIUM 150 MCG: 0.1 TABLET ORAL at 09:52

## 2024-10-18 RX ADMIN — VENLAFAXINE HYDROCHLORIDE 75 MG: 75 CAPSULE, EXTENDED RELEASE ORAL at 08:28

## 2024-10-18 RX ADMIN — LISINOPRIL 10 MG: 10 TABLET ORAL at 08:28

## 2024-10-18 RX ADMIN — ARIPIPRAZOLE 5 MG: 5 TABLET ORAL at 09:52

## 2024-10-18 NOTE — PROGRESS NOTES
" [facility]     Psychiatric Progress Note    Patient Name: Kerry Foster  : 1975  MRN: [id number]  Primary Care Physician:  Catrachita Rojas APRN  Date of admission: 10/16/2024    Subjective   Subjective     Patient seen and chart reviewed, discussed with staff.    Chief Complaint: Depression      HPI:     Staff reports the patient continues to voice anger that she was found in suicide attempt was not successful.  Her daughter and father visited last night and apparently went well.  Continues to have grief.    Patient continues to be severely depressed.  Continues to miss her .  They have known each other for almost 4 years and been  almost 3 years.  States this was the first person she ever found that truly loved her and that the relationship was good.  Continues to have extensive grief feeling like she cannot go on without him.    Discussed adding aripiprazole and she was agreeable.    Patient up walking under her own power.  She is steady and denies feeling wobbly.  The patient able to ambulate the hallway without assistance and will discontinue the safety sitter    Discussed lab findings include an elevated TSH, and she was previously on levothyroxine.  We will reinitiate levothyroxine and discussed the importance of following up with primary care or endocrinology      Objective   Objective     Vitals:   Temp:  [97.3 °F (36.3 °C)-97.5 °F (36.4 °C)] 97.3 °F (36.3 °C)  Heart Rate:  [68-75] 73  Resp:  [18] 18  BP: ()/(69-74) 99/73          Mental Status Exam:      Appearance:   Well-developed, nourished, appears stated  Reliability:   Good  Eye Contact:   Fair  Concentration/Focus:    Tentative   Behaviors:    No agitation, some slowing  Memory :    Intact  Speech:    Normal rate and volume  Language:   Appropriate  Mood :    \"I am just here, I do not care much about anything\"  Affect:    Flat  Thought process:    Negative, hopeless, helpless  Thought Content:    Continues " to endorse wishes of death and suicidal ideation with no plan or intent, no homicidal ideation, no hallucinations  Insight:   Fair  Judgement:    Intact with no behavioral disturbance      Result Review    Result Review:  I have personally reviewed the results from the time of this admission to 10/18/2024 11:46 EDT and agree with these findings:  []  Laboratory  []  Microbiology  []  Radiology  []  EKG/Telemetry   []  Cardiology/Vascular   []  Pathology  []  Old records  []  Other:  Most notable findings include:     Lab Results (last 24 hours)       Procedure Component Value Units Date/Time    T4, Free [[id number]]  (Normal) Collected: 10/16/24 1204    Specimen: Blood Updated: 10/17/24 1152     Free T4 0.95 ng/dL     TSH [[id number]]  (Abnormal) Collected: 10/16/24 1204    Specimen: Blood Updated: 10/17/24 1152     TSH 17.360 uIU/mL                 Medications:   ARIPiprazole, 5 mg, Oral, Daily  levothyroxine, 150 mcg, Oral, Q AM  lisinopril, 10 mg, Oral, Daily  venlafaxine XR, 75 mg, Oral, Daily With Breakfast          Assessment / Plan       Active Hospital Problems:  Active Hospital Problems    Diagnosis     Major depressive disorder, recurrent episode, moderate     Class 2 obesity in adult     Essential hypertension     Hypothyroidism        Plan:      consult for grief and loss  Add aripiprazole  Levothyroxine 150 mcg daily  Work on mood stabilization and abatement of any suicidal ideation or psychosis.  Work on appropriate safety plan  Continue supportive therapy  Patient to engage in all group and individual treatment modalities available on the unit  Obtain collateral information if possible  Titrate medications as clinically indicated  Work on appropriate disposition follow-up including referrals to substance abuse treatment if indicated      Disposition:  I expect patient to be discharged 4 to 5 days.    Part of this note may be an electronic transcription/translation of spoken language to printed  text using the Dragon dictation system.         Electronically signed by Eldon Urbina MD, 10/18/24, 11:46 AM EDT.

## 2024-10-18 NOTE — PLAN OF CARE
"Goal Outcome Evaluation:  Plan of Care Reviewed With: patient  Plan of Care Reviewed With: patient  Patient Agreement with Plan of Care: agrees     Progress: no change.  Patient has been calm, cooperative with care and safety measures and participating in assessments.  Patient's father, daughter and a close friend came for visitation last night, and she stated it was good to see them.  Patient remains very down with a depressed affect, voices she still has some suicidal thoughts, but denies any plan or intent here in the hospital.  Patient states she just keeps thinking \"I wish they hadn't found me, and had left me alone to die.\" Emotional support provided. Patient rates depression and anxiety as unchanged 10/10, and voices some hopelessness. Patient states \"I can't get away from this grief, it is always on my mind, I think about him all the time.  Patient denies H/I and A/V/H.  Patient would like a  visit when available, order to be re-submitted.  Spoke with patient about depression and coping with her suicidal thoughts, reinforcement is ongoing. Patient encouraged to stay and allow staff to assist her  and provide safe care and she was agreeable.  Safe environment provided and safety watch is at bedside.                                 "
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
## 2024-10-18 NOTE — PLAN OF CARE
Goal Outcome Evaluation:  Plan of Care Reviewed With: patient  Patient Agreement with Plan of Care: agrees   Patient alert and oriented and compliant with medications. Patient denies S/I, H/I or hallucinations. Patient continues to be very depressed and rates depression, anxiety, and hopelessness all a 10. Patient has been lying in bed sleeping majority of shift. No inappropriate or aggressive behavior noted. Will continue to monitor for any changes in mood or behavior.

## 2024-10-19 RX ADMIN — TRAZODONE HYDROCHLORIDE 50 MG: 50 TABLET ORAL at 21:18

## 2024-10-19 RX ADMIN — ARIPIPRAZOLE 5 MG: 5 TABLET ORAL at 09:06

## 2024-10-19 RX ADMIN — LEVOTHYROXINE SODIUM 150 MCG: 0.1 TABLET ORAL at 09:06

## 2024-10-19 RX ADMIN — LISINOPRIL 10 MG: 10 TABLET ORAL at 09:07

## 2024-10-19 RX ADMIN — VENLAFAXINE HYDROCHLORIDE 75 MG: 75 CAPSULE, EXTENDED RELEASE ORAL at 09:06

## 2024-10-19 NOTE — PLAN OF CARE
Goal Outcome Evaluation:  Plan of Care Reviewed With: patient  Patient Agreement with Plan of Care: agrees   Patient alert and oriented and calm and cooperative with staff. Patient denies S/I, H/I or hallucinations. Patient reports her mood has improved and denies anxiety and reports depression is only at a 4. No inappropriate or aggressive behavior noted. Will continue to monitor for any changes in mood or behavior.

## 2024-10-19 NOTE — PROGRESS NOTES
Kerry Foster  49 y.o.  270/1  10/19/24  Eldon Urbina MD    Subjective     Patient is seen and evaluated by me latest clinical data reviewed discussed with the staff.  Patient stated that she feels much better today and she rates her depression and anxiety 1 out of 10.  Patient lost her  on September 7.  She thinks about it she has sweating anywhere 3 next week.  Currently she is regretting what she did.  Now she does not believe that that was the right thing to do.  Her ideas seems to be change from what she thought before since she had shown anger for being found to live.  Her depression and anxiety has certainly dropped to a very low level.  Seems like she is gradually adjusting to the environment.  She has been taking her medication and she believes it is helping.  She stated that trazodone really helped her to sleep last night initially she felt some stomach upset but eventually it was helpful    Objective     Patient has been extremely depressed she lost her  in September she also lost 3 weeks grandchild and her mother.  Staff reported that patient has been very tearful she has been calm and compliant with the medication.  She is staying to herself no other issues    Vitals:    10/19/24 0949   BP: 103/79   Pulse: 82   Resp: 18   Temp: 97.9 °F (36.6 °C)   SpO2: 99%       Result Review       Current Facility-Administered Medications:     acetaminophen (TYLENOL) tablet 650 mg, 650 mg, Oral, Q6H PRN, Carol Murrell MD    aluminum-magnesium hydroxide-simethicone (MAALOX MAX) 400-400-40 MG/5ML suspension 15 mL, 15 mL, Oral, Q6H PRN, Carol Murrell MD    ARIPiprazole (ABILIFY) tablet 5 mg, 5 mg, Oral, Daily, Eldon Urbina MD, 5 mg at 10/19/24 0906    LORazepam (ATIVAN) tablet 2 mg, 2 mg, Oral, Q4H PRN **AND** haloperidol (HALDOL) tablet 5 mg, 5 mg, Oral, Q4H PRN **AND** diphenhydrAMINE (BENADRYL) capsule 50 mg, 50 mg, Oral, Q4H PRN, Carol Murrell MD    haloperidol lactate (HALDOL) injection 5  mg, 5 mg, Intramuscular, Q4H PRN **AND** diphenhydrAMINE (BENADRYL) injection 50 mg, 50 mg, Intramuscular, Q4H PRN **AND** LORazepam (ATIVAN) injection 2 mg, 2 mg, Intramuscular, Q4H PRN, Carol Murrell MD    hydrOXYzine (ATARAX) tablet 50 mg, 50 mg, Oral, Q6H PRN, Carol Murrell MD    levothyroxine (SYNTHROID, LEVOTHROID) tablet 150 mcg, 150 mcg, Oral, Q AM, Eldon Urbina MD, 150 mcg at 10/19/24 0906    lisinopril (PRINIVIL,ZESTRIL) tablet 10 mg, 10 mg, Oral, Daily, Eldon Urbina MD, 10 mg at 10/19/24 0907    loperamide (IMODIUM) capsule 2 mg, 2 mg, Oral, Q2H PRN, Carol Murrell MD    magnesium hydroxide (MILK OF MAGNESIA) suspension 10 mL, 10 mL, Oral, Daily PRN, Carol Murrell MD    nicotine (NICODERM CQ) 21 MG/24HR patch 1 patch, 1 patch, Transdermal, Daily PRN, Carol Murrell MD    traZODone (DESYREL) tablet 50 mg, 50 mg, Oral, Nightly PRN, Carol Murrell MD, 50 mg at 10/18/24 2036    venlafaxine XR (EFFEXOR-XR) 24 hr capsule 75 mg, 75 mg, Oral, Daily With Breakfast, Eldon Urbina MD, 75 mg at 10/19/24 0906    Mental Status exam:    Appearance: Unkempt calm  Concentration/Focus: Fair  Behaviors: Cooperative  Cognitive function: Alert and oriented x 3  Memory : Fair in all 3 spheres  Speech: Clear coherent soft tone  Language: Normal  Mood : Fair  Affect: Directed  Thought process: Linear goal-directed  Thought Content: No paranoia auditory or visual hallucination no suicidal homicidal ideation  Insight: Improved  Judgement: Improved      Assessment       Major depressive disorder, recurrent episode, moderate    Hypothyroidism    Essential hypertension    Class 2 obesity in adult       Plan:     Continue inpatient hospitalization for her safety  Monitor symptom resolution  Currently she appears improved monitor for continued stability and improvement.  She has her anniversary next week which might be another stressful event  Encouraged for group engagement  Continue to address grief issues  Supportive  psychotherapy is provided to the patient      Electronically signed by Carol Murrell MD, 10/19/24, 10:29 AM EDT.

## 2024-10-19 NOTE — PLAN OF CARE
"Goal Outcome Evaluation:    Pt is alert, oriented, not in distress. The patient advised that she is feeling down and she does exhibit a flat, depressed affect. Upon assessment, the patient was noted to have a necklace on and she was advised that her necklace needed to be placed with security due to ligature risk. The patient was hesitant at first, but did give the necklace to staff. The patient denied current SI, HI or AVH. The patient rated her anxiety and depression as 10/10 because she stated \"I don't care anymore.\" The patient was observed on the phone a couple of times. The patient did attend group and did have a snack for nourishment. Care of this patient is ongoing. -- AS RN  "

## 2024-10-20 RX ADMIN — ARIPIPRAZOLE 5 MG: 5 TABLET ORAL at 08:22

## 2024-10-20 RX ADMIN — LEVOTHYROXINE SODIUM 150 MCG: 0.1 TABLET ORAL at 07:15

## 2024-10-20 RX ADMIN — LISINOPRIL 10 MG: 10 TABLET ORAL at 08:22

## 2024-10-20 RX ADMIN — TRAZODONE HYDROCHLORIDE 50 MG: 50 TABLET ORAL at 21:43

## 2024-10-20 RX ADMIN — VENLAFAXINE HYDROCHLORIDE 75 MG: 75 CAPSULE, EXTENDED RELEASE ORAL at 08:22

## 2024-10-20 NOTE — PLAN OF CARE
Goal Outcome Evaluation:    Pt is alert, oriented, not in distress. The patient is withdrawn to her room, resting. The patient has a brighter affect this evening and denies current SI, HI or AVH. The patient rates her anxiety and depression 0/10. The reports that prayers and thinking about her situation have improved her symptoms. Pt had a snack for nourishment. The patient had trazodone for sleep. Care of this patient is ongoing. -- AS RN

## 2024-10-20 NOTE — PLAN OF CARE
Goal Outcome Evaluation:  Plan of Care Reviewed With: patient  Patient Agreement with Plan of Care: agrees   Patient alert and oriented and calm and cooperative with staff. Patient denies S/I, H/I, or hallucinations. Patient reports she feels pretty good today and rates depression and anxiety both a 0. No inappropriate or aggressive behavior noted. Will continue to monitor for any changes in mood or behavior.

## 2024-10-20 NOTE — PROGRESS NOTES
Kerry Foster  49 y.o.  270/1  10/20/24  Eldon Urbina MD    Subjective     Patient is seen and evaluated by me latest clinical data reviewed discussed with the staff.  Patient stated she is feeling much better she has started talking to her children and they are very supportive and that has helped her to improve her mood.  She is feeling more hopeful.  Her wedding anniversary is next week but she can handle it.  She lives with her daughter who is 24 years old and she is very supportive according to her.  Patient has progressively improved to deal with her grief.    Objective     Staff reported that patient has been doing much better than before she has been talking to kids.  She stated that talking to kids and praying has helped her.    Vitals:    10/20/24 0809   BP: 102/70   Pulse: 77   Resp: 18   Temp: 97.3 °F (36.3 °C)   SpO2: 99%       Result Review       Current Facility-Administered Medications:     acetaminophen (TYLENOL) tablet 650 mg, 650 mg, Oral, Q6H PRN, Carol Murrell MD    aluminum-magnesium hydroxide-simethicone (MAALOX MAX) 400-400-40 MG/5ML suspension 15 mL, 15 mL, Oral, Q6H PRN, Carol Murrell MD    ARIPiprazole (ABILIFY) tablet 5 mg, 5 mg, Oral, Daily, Eldon Urbina MD, 5 mg at 10/20/24 0822    LORazepam (ATIVAN) tablet 2 mg, 2 mg, Oral, Q4H PRN **AND** haloperidol (HALDOL) tablet 5 mg, 5 mg, Oral, Q4H PRN **AND** diphenhydrAMINE (BENADRYL) capsule 50 mg, 50 mg, Oral, Q4H PRN, Carol Murrell MD    haloperidol lactate (HALDOL) injection 5 mg, 5 mg, Intramuscular, Q4H PRN **AND** diphenhydrAMINE (BENADRYL) injection 50 mg, 50 mg, Intramuscular, Q4H PRN **AND** LORazepam (ATIVAN) injection 2 mg, 2 mg, Intramuscular, Q4H PRN, Carol Murrell MD    hydrOXYzine (ATARAX) tablet 50 mg, 50 mg, Oral, Q6H PRN, Carol Murrell MD    levothyroxine (SYNTHROID, LEVOTHROID) tablet 150 mcg, 150 mcg, Oral, Q AM, Eldon Urbina MD, 150 mcg at 10/20/24 0715    lisinopril (PRINIVIL,ZESTRIL) tablet 10 mg, 10  mg, Oral, Daily, Eldon Urbina MD, 10 mg at 10/20/24 0822    loperamide (IMODIUM) capsule 2 mg, 2 mg, Oral, Q2H PRN, Carol Murrell MD    magnesium hydroxide (MILK OF MAGNESIA) suspension 10 mL, 10 mL, Oral, Daily PRN, Carol Murrell MD    nicotine (NICODERM CQ) 21 MG/24HR patch 1 patch, 1 patch, Transdermal, Daily PRN, Carol Murrell MD    traZODone (DESYREL) tablet 50 mg, 50 mg, Oral, Nightly PRN, Carol Murrell MD, 50 mg at 10/19/24 2118    venlafaxine XR (EFFEXOR-XR) 24 hr capsule 75 mg, 75 mg, Oral, Daily With Breakfast, Eldon Urbina MD, 75 mg at 10/20/24 0822    Mental Status exam:    Appearance: Calm and cooperative  Concentration/Focus: Fairly good  Behaviors: Cooperative  Cognitive function: Alert and oriented x 3  Memory : Intact in all 3 spheres  Speech: Clear  Language: Normal  Mood : Better  Affect: Euthymic  Thought process: Linear goal-directed  Thought Content: Denies auditory visual hallucination denies being suicidal or homicidal  Insight: Improved  Judgement: Improved improved      Assessment       Major depressive disorder, recurrent episode, moderate    Hypothyroidism    Essential hypertension    Class 2 obesity in adult       Plan:     Continue current medications and treatment since patient has recovered from that acute phase of her depression and inability to deal with the circumstances.  She has improved and her mood and needs continued in that because this is a big change in her in the short period of time.  At present patient appears very hopeful and looking forward for discharge.  Continue to encourage patient and to comply with the treatment and participate in groups on the unit  Patient to continue with her medication and also possibility of some therapy including counseling as an outpatient for the continuity of care  Collateral information to be obtained from the family to know if they are comfortable her coming home  Supportive psychotherapy is provided to the  patient      Electronically signed by Carol Murrell MD, 10/20/24, 11:01 AM EDT.

## 2024-10-21 VITALS
TEMPERATURE: 97.5 F | HEIGHT: 63 IN | SYSTOLIC BLOOD PRESSURE: 105 MMHG | DIASTOLIC BLOOD PRESSURE: 75 MMHG | RESPIRATION RATE: 18 BRPM | BODY MASS INDEX: 38.27 KG/M2 | WEIGHT: 216 LBS | OXYGEN SATURATION: 100 % | HEART RATE: 70 BPM

## 2024-10-21 RX ORDER — TRAZODONE HYDROCHLORIDE 50 MG/1
50 TABLET, FILM COATED ORAL NIGHTLY PRN
Qty: 30 TABLET | Refills: 1 | Status: SHIPPED | OUTPATIENT
Start: 2024-10-21

## 2024-10-21 RX ORDER — LEVOTHYROXINE SODIUM 150 UG/1
150 TABLET ORAL
Qty: 30 TABLET | Refills: 1 | Status: SHIPPED | OUTPATIENT
Start: 2024-10-22

## 2024-10-21 RX ORDER — VENLAFAXINE HYDROCHLORIDE 75 MG/1
75 CAPSULE, EXTENDED RELEASE ORAL
Qty: 30 CAPSULE | Refills: 1 | Status: SHIPPED | OUTPATIENT
Start: 2024-10-22

## 2024-10-21 RX ORDER — ARIPIPRAZOLE 5 MG/1
5 TABLET ORAL DAILY
Qty: 30 TABLET | Refills: 1 | Status: SHIPPED | OUTPATIENT
Start: 2024-10-22

## 2024-10-21 RX ADMIN — LISINOPRIL 10 MG: 10 TABLET ORAL at 08:30

## 2024-10-21 RX ADMIN — ARIPIPRAZOLE 5 MG: 5 TABLET ORAL at 08:30

## 2024-10-21 RX ADMIN — VENLAFAXINE HYDROCHLORIDE 75 MG: 75 CAPSULE, EXTENDED RELEASE ORAL at 08:30

## 2024-10-21 RX ADMIN — LEVOTHYROXINE SODIUM 150 MCG: 0.1 TABLET ORAL at 07:04

## 2024-10-21 NOTE — SIGNIFICANT NOTE
10/21/24 1140   Plan   Patient/Family in Agreement with Plan yes   Final Discharge Disposition Code 01 - home or self-care   Final Note RN-NN introduced self. Discussed safe discharge plan with patient. The patient reports she lives at home with her father and daughter. Patient reports at discharge her plan is to return home. Pharmacy verified Meds to bed. Patient reports she has a PV and her father will provide transportation after discharge. Discussed with patient. Patient gave verbal LAURA to discuss discharge plan with her father Jakob Childress. RN-NN contacted the patient’s father, he verified that he will provide transportation at discharge. The patient request to have outpatient services set up with [facility] for medication management. Appointment scheduled 10/29/24 at 9:30am.

## 2024-10-21 NOTE — DISCHARGE SUMMARY
[facility]         DISCHARGE SUMMARY    Patient Name: Kerry Foster  : 1975  MRN: [id number]    Date of Admission: 10/16/2024  Date of Discharge: 10/21/2024  Primary Care Physician: Catrachita Rojas APRN    Consults       No orders found from 2024 to 10/17/2024.            Presenting Problem:   Adjustment disorder with mixed disturbance of emotions and conduct [F43.25]    Active and Resolved Hospital Problems:  Active Hospital Problems    Diagnosis POA   • Major depressive disorder, recurrent episode, moderate [F33.1] Yes   • Class 2 obesity in adult [E66.812] Yes   • Essential hypertension [I10] Yes   • Hypothyroidism [E03.9] Yes      Resolved Hospital Problems   No resolved problems to display.         Hospital Course     Hospital Course:  Kerry Foster is a 49 y.o. female with a history of hypertension, hypothyroidism, and depression that was admitted on a 72-hour hold for reported polypharmacy overdose and suicide attempt.  Patient apparently took multiple home medications.    Patient's  passed away about 1 month ago and she has been unable to deal with this.  She reports feeling devastated by his death because he was first 1 that truly understood her.  Patient reported feeling very despondent.  She apparently was found by her father and ambulance was called and she was brought to the hospital.    Patient on day 1 of hospitalization continued to express feeling sad and hopeless.  She continued to voice suicidal ideations.  However the suicidal ideations dissipated over the course of her stay in the hospital.    She had previously been on sertraline and this was continued and she was started on venlafaxine.  Continued to have depression and suicidal ideations on day 2 of hospitalization given the severity of her depression felt augmentation with aripiprazole was appropriate and she was agreeable and it was started at 5 mg.    She has been sleeping well here in  "the hospital and has been using trazodone 50 mg at bedtime and this will be continued at bedtime after discharge.    Patient previously diagnosed with hypothyroidism has not been treated for some time.  She had an elevated TSH and levothyroxine was reinitiated and she will need follow-up primary care provider.  Discussed that low thyroid can contribute to depression and the importance of being on levothyroxine to maintain appropriate thyroid levels.    She is tolerating her medications well.  She has had no side effects.  She been calm and cooperative.  She is denying suicidal ideation today.  She is engaging cooperative on day of discharge.  She makes good eye contact.  She had multiple visitors yesterday and states she recognizes her thinking was rather selfish.  She knows that she has a good support system at home and she returned to them before doing what she had done.  She reports that she feels hopeful now.  She reports that she is feeling much better.  She is denying any suicidal ideation.  She denies any side effects of medications.  The patient states that she is doing a lot better and also attributes improvement to improve sleep.  Patient is calm and pleasant.  She is requesting discharge today.  Do not anticipate any further medication changes.  Patient is able to voice an appropriate safety plan.      On day of discharge patient is calm, cooperative, engaging, and has no acute agitation or restlessness.  Speech is normal rate and volume and language is appropriate, relevant.  Mood is described as \"a lot better, hopeful, feel good\" and affect is euthymic.  Thought processes are future oriented, goal directed, linear.  Thought content is negative for suicidal or homicidal ideations, no hallucinations.  Insight is good, and judgment is appropriate.      DISCHARGE Follow Up Recommendations for labs and diagnostics: Routine health maintenance from primary care including following up of thyroid levels and " blood pressure control, [facility]      Day of Discharge     Vital Signs:  Temp:  [97.5 °F (36.4 °C)-97.7 °F (36.5 °C)] 97.5 °F (36.4 °C)  Heart Rate:  [70-72] 70  Resp:  [14-18] 18  BP: (105-110)/(63-75) 105/75      Pertinent  and/or Most Recent Results     LAB RESULTS:      Lab 10/16/24  1204   WBC 7.03   HEMOGLOBIN 13.7   HEMATOCRIT 41.2   PLATELETS 451*   NEUTROS ABS 4.39   IMMATURE GRANS (ABS) 0.04   LYMPHS ABS 1.92   MONOS ABS 0.56   EOS ABS 0.08   MCV 91.8         Lab 10/16/24  1204   SODIUM 142   POTASSIUM 3.5   CHLORIDE 106   CO2 25.1   ANION GAP 10.9   BUN 9   CREATININE 0.82   EGFR 88.4   GLUCOSE 107*   CALCIUM 9.8   MAGNESIUM 2.3   TSH 17.360*         Lab 10/16/24  1204   TOTAL PROTEIN 8.5   ALBUMIN 4.4   GLOBULIN 4.1   ALT (SGPT) 30   AST (SGOT) 27   BILIRUBIN 0.3   ALK PHOS 91                                     Lab 10/16/24  1204   ETHANOL PCT <0.010   ETHANOL MGDL <10         Lab 10/16/24  1239   BENZODIAZEPINE SCREEN, URINE Negative   COCAINE SCREEN, URINE Negative   OPIATES Negative   THC URINE SCREEN Negative   METHADONE SCREEN, URINE Negative     Brief Urine Lab Results       None               CT Cervical Spine Without Contrast    Result Date: 10/7/2024  Impression: Impression: No acute abnormality of the cervical spine. Chronic degenerative and surgical findings as above. Electronically Signed: Demetris Esquivel MD  10/7/2024 4:25 PM EDT  Workstation ID: [id number]    XR Spine Lumbar 2 or 3 View    Result Date: 10/7/2024  Impression: Impression: 1.Progressive degenerative change lumbar spine. 2.Left renal calculus suggested. Electronically Signed: John Johnson MD  10/7/2024 4:23 PM EDT  Workstation ID: [id number]    XR Spine Thoracic 3 View    Result Date: 10/7/2024  Impression: Impression: Previous C5-C7 ACDF Mild multilevel degenerative disc disease. No acute fracture or malalignment Electronically Signed: Mathew Todd MD  10/7/2024 3:26 PM EDT  Workstation ID: [id number]    XR Shoulder 2+ View  Left    Result Date: 10/7/2024  Impression: Impression: Mild AC joint arthrosis No acute osseous abnormality Electronically Signed: Gentry Raya MD  10/7/2024 3:25 PM EDT  Workstation ID: [id number]                 Imaging Results (Last 7 Days)       ** No results found for the last 168 hours. **             Labs Pending at Discharge:           Discharge Details        Discharge Medications        New Medications        Instructions Start Date   ARIPiprazole 5 MG tablet  Commonly known as: ABILIFY   5 mg, Oral, Daily   Start Date: October 22, 2024     levothyroxine 150 MCG tablet  Commonly known as: SYNTHROID, LEVOTHROID   150 mcg, Oral, Every Early Morning   Start Date: October 22, 2024     traZODone 50 MG tablet  Commonly known as: DESYREL   50 mg, Oral, Nightly PRN      venlafaxine XR 75 MG 24 hr capsule  Commonly known as: EFFEXOR-XR   75 mg, Oral, Daily With Breakfast   Start Date: October 22, 2024            Continue These Medications        Instructions Start Date   cyclobenzaprine 10 MG tablet  Commonly known as: FLEXERIL   10 mg, Oral, 3 Times Daily      lisinopril 10 MG tablet  Commonly known as: PRINIVIL,ZESTRIL   10 mg, Daily      meloxicam 15 MG tablet  Commonly known as: MOBIC   15 mg, Daily             Stop These Medications      ALPRAZolam 0.5 MG tablet  Commonly known as: XANAX     sertraline 50 MG tablet  Commonly known as: ZOLOFT              Allergies   Allergen Reactions   • Codeine Hives   • Morphine Anaphylaxis         Discharge Disposition:  Home or Self Care    Diet:  Hospital:  Diet Order   Procedures   • Diet: Regular/House; Fluid Consistency: Thin (IDDSI 0)         Discharge Activity: Ad julián.  Activity Instructions       Activity as Tolerated              Discharge Condition: Stable    CODE STATUS:  Code Status and Medical Interventions: CPR (Attempt to Resuscitate); Full Support   Ordered at: 10/16/24 9078     Code Status (Patient has no pulse and is not breathing):    CPR (Attempt to  Resuscitate)     Medical Interventions (Patient has pulse or is breathing):    Full Support         No future appointments.    Additional Instructions for the Follow-ups that You Need to Schedule       Discharge Follow-up with PCP   As directed       Currently Documented PCP:    Catrachita Rojas APRN    PCP Phone Number:    [phone]     Follow Up Details: Primary care as needed        Discharge Follow-up with Specified Provider: [facility]   As directed      To: [facility] clinic                Time spent on Discharge including face to face service: 40 minutes    Part of this note may be an electronic transcription/translation of spoken language to printed text using the Dragon dictation system.        Electronically signed by Eldon Urbina MD, 10/21/24, 11:09 AM EDT.

## 2024-10-21 NOTE — PLAN OF CARE
Goal Outcome Evaluation:  Plan of Care Reviewed With: patient  Plan of Care Reviewed With: patient  Patient Agreement with Plan of Care: agrees     Progress: improving.  Patient has been out of room, sitting in dayroom often interacting with peers and watching TV.  Patient has made phone calls to her family.  Gait is steady and movement is fluent, reflecting improvement from Thursday 10/17/24.  Patient's speech is clear, effective and appropriate.  Patient participates well in shift nursing assessment.  Patient denied any current suicidal thoughts, and rates depression and anxiety at 0/10.  When patient asked about the improvement in her ratings, she states it is because she has talked to the  and her children and supportive family and feels better about dealing with her grief.  She states several of her family told her they wanted her to reach out and call them and would have helped her at her time of crisis if they had known she felt so bad.  Patient states that going forward, she will not isolate herself so much and she will reach out to involve her family.  Spoke with patient about utilizing the suicide hotline and the 988 number for times of crisis, as well as having a safety plan.  Patient educated on what a safety plan is and it's use and this will be provided for patient to fill out and discuss with staff after breakfast today.  Patient also provided with reading materials on depression and suicide for her and her family to reference. Patient verbalized understanding.  Patient denies H/I and A/V/H, and did request sleep medication last night. Emotional support and safe environment provided.

## 2024-10-21 NOTE — PLAN OF CARE
Goal Outcome Evaluation:  Plan of Care Reviewed With: patient  Patient Agreement with Plan of Care: agrees   Patient has reached all goals and will be discharged from unit. Patient to continue on outpatient basis.

## 2024-10-23 LAB
QT INTERVAL: 400 MS
QTC INTERVAL: 456 MS

## 2024-11-21 ENCOUNTER — HOSPITAL ENCOUNTER (INPATIENT)
Facility: HOSPITAL | Age: 49
LOS: 3 days | Discharge: HOME | DRG: 885 | End: 2024-11-24
Attending: PSYCHIATRY & NEUROLOGY | Admitting: PSYCHIATRY & NEUROLOGY
Payer: MEDICAID

## 2024-11-21 ENCOUNTER — PREP FOR SURGERY (OUTPATIENT)
Dept: OTHER | Facility: HOSPITAL | Age: 49
End: 2024-11-21
Payer: MEDICAID

## 2024-11-21 DIAGNOSIS — F33.2 SEVERE RECURRENT MAJOR DEPRESSION WITHOUT PSYCHOTIC FEATURES: Primary | ICD-10-CM

## 2024-11-21 DIAGNOSIS — F33.2 SEVERE RECURRENT MAJOR DEPRESSION WITHOUT PSYCHOTIC FEATURES: ICD-10-CM

## 2024-11-21 LAB
ALBUMIN SERPL-MCNC: 4.1 G/DL (ref 3.5–5.2)
ALBUMIN/GLOB SERPL: 1.1 G/DL
ALP SERPL-CCNC: 90 U/L (ref 39–117)
ALT SERPL W P-5'-P-CCNC: 42 U/L (ref 1–33)
AMPHET+METHAMPHET UR QL: NEGATIVE
AMPHETAMINES UR QL: NEGATIVE
ANION GAP SERPL CALCULATED.3IONS-SCNC: 12.6 MMOL/L (ref 5–15)
AST SERPL-CCNC: 31 U/L (ref 1–32)
BACTERIA UR QL AUTO: ABNORMAL /HPF
BARBITURATES UR QL SCN: NEGATIVE
BASOPHILS # BLD AUTO: 0.02 10*3/MM3 (ref 0–0.2)
BASOPHILS NFR BLD AUTO: 0.2 % (ref 0–1.5)
BENZODIAZ UR QL SCN: NEGATIVE
BILIRUB SERPL-MCNC: 0.2 MG/DL (ref 0–1.2)
BILIRUB UR QL STRIP: NEGATIVE
BUN SERPL-MCNC: 12 MG/DL (ref 6–20)
BUN/CREAT SERPL: 18.8 (ref 7–25)
BUPRENORPHINE SERPL-MCNC: NEGATIVE NG/ML
CALCIUM SPEC-SCNC: 9.6 MG/DL (ref 8.6–10.5)
CANNABINOIDS SERPL QL: NEGATIVE
CHLORIDE SERPL-SCNC: 105 MMOL/L (ref 98–107)
CLARITY UR: CLEAR
CO2 SERPL-SCNC: 24.4 MMOL/L (ref 22–29)
COCAINE UR QL: NEGATIVE
COLOR UR: YELLOW
CREAT SERPL-MCNC: 0.64 MG/DL (ref 0.57–1)
DEPRECATED RDW RBC AUTO: 42.7 FL (ref 37–54)
EGFRCR SERPLBLD CKD-EPI 2021: 109.2 ML/MIN/1.73
EOSINOPHIL # BLD AUTO: 0.08 10*3/MM3 (ref 0–0.4)
EOSINOPHIL NFR BLD AUTO: 0.9 % (ref 0.3–6.2)
ERYTHROCYTE [DISTWIDTH] IN BLOOD BY AUTOMATED COUNT: 12.3 % (ref 12.3–15.4)
ETHANOL BLD-MCNC: <10 MG/DL (ref 0–10)
ETHANOL UR QL: <0.01 %
FENTANYL UR-MCNC: NEGATIVE NG/ML
GLOBULIN UR ELPH-MCNC: 3.7 GM/DL
GLUCOSE SERPL-MCNC: 84 MG/DL (ref 65–99)
GLUCOSE UR STRIP-MCNC: NEGATIVE MG/DL
HCT VFR BLD AUTO: 40.5 % (ref 34–46.6)
HGB BLD-MCNC: 12.9 G/DL (ref 12–15.9)
HGB UR QL STRIP.AUTO: NEGATIVE
HYALINE CASTS UR QL AUTO: ABNORMAL /LPF
IMM GRANULOCYTES # BLD AUTO: 0.02 10*3/MM3 (ref 0–0.05)
IMM GRANULOCYTES NFR BLD AUTO: 0.2 % (ref 0–0.5)
KETONES UR QL STRIP: NEGATIVE
LEUKOCYTE ESTERASE UR QL STRIP.AUTO: ABNORMAL
LYMPHOCYTES # BLD AUTO: 1.87 10*3/MM3 (ref 0.7–3.1)
LYMPHOCYTES NFR BLD AUTO: 21.6 % (ref 19.6–45.3)
MCH RBC QN AUTO: 29.9 PG (ref 26.6–33)
MCHC RBC AUTO-ENTMCNC: 31.9 G/DL (ref 31.5–35.7)
MCV RBC AUTO: 93.8 FL (ref 79–97)
METHADONE UR QL SCN: NEGATIVE
MONOCYTES # BLD AUTO: 0.66 10*3/MM3 (ref 0.1–0.9)
MONOCYTES NFR BLD AUTO: 7.6 % (ref 5–12)
NEUTROPHILS NFR BLD AUTO: 5.99 10*3/MM3 (ref 1.7–7)
NEUTROPHILS NFR BLD AUTO: 69.5 % (ref 42.7–76)
NITRITE UR QL STRIP: NEGATIVE
NRBC BLD AUTO-RTO: 0 /100 WBC (ref 0–0.2)
OPIATES UR QL: NEGATIVE
OXYCODONE UR QL SCN: NEGATIVE
PCP UR QL SCN: NEGATIVE
PH UR STRIP.AUTO: 6 [PH] (ref 5–8)
PLATELET # BLD AUTO: 401 10*3/MM3 (ref 140–450)
PMV BLD AUTO: 8.8 FL (ref 6–12)
POTASSIUM SERPL-SCNC: 3.6 MMOL/L (ref 3.5–5.2)
PROT SERPL-MCNC: 7.8 G/DL (ref 6–8.5)
PROT UR QL STRIP: NEGATIVE
RBC # BLD AUTO: 4.32 10*6/MM3 (ref 3.77–5.28)
RBC # UR STRIP: ABNORMAL /HPF
REF LAB TEST METHOD: ABNORMAL
SODIUM SERPL-SCNC: 142 MMOL/L (ref 136–145)
SP GR UR STRIP: 1.03 (ref 1–1.03)
SQUAMOUS #/AREA URNS HPF: ABNORMAL /HPF
T4 FREE SERPL-MCNC: 2.57 NG/DL (ref 0.92–1.68)
TRICYCLICS UR QL SCN: NEGATIVE
TSH SERPL DL<=0.05 MIU/L-ACNC: 0.04 UIU/ML (ref 0.27–4.2)
UROBILINOGEN UR QL STRIP: ABNORMAL
WBC # UR STRIP: ABNORMAL /HPF
WBC NRBC COR # BLD AUTO: 8.64 10*3/MM3 (ref 3.4–10.8)

## 2024-11-21 PROCEDURE — 81001 URINALYSIS AUTO W/SCOPE: CPT | Performed by: PSYCHIATRY & NEUROLOGY

## 2024-11-21 PROCEDURE — 82077 ASSAY SPEC XCP UR&BREATH IA: CPT | Performed by: PSYCHIATRY & NEUROLOGY

## 2024-11-21 PROCEDURE — 84443 ASSAY THYROID STIM HORMONE: CPT | Performed by: PSYCHIATRY & NEUROLOGY

## 2024-11-21 PROCEDURE — 84439 ASSAY OF FREE THYROXINE: CPT | Performed by: PSYCHIATRY & NEUROLOGY

## 2024-11-21 PROCEDURE — 80053 COMPREHEN METABOLIC PANEL: CPT | Performed by: PSYCHIATRY & NEUROLOGY

## 2024-11-21 PROCEDURE — 80307 DRUG TEST PRSMV CHEM ANLYZR: CPT | Performed by: PSYCHIATRY & NEUROLOGY

## 2024-11-21 PROCEDURE — 87086 URINE CULTURE/COLONY COUNT: CPT | Performed by: PSYCHIATRY & NEUROLOGY

## 2024-11-21 PROCEDURE — 85025 COMPLETE CBC W/AUTO DIFF WBC: CPT | Performed by: PSYCHIATRY & NEUROLOGY

## 2024-11-21 RX ORDER — LOPERAMIDE HYDROCHLORIDE 2 MG/1
2 CAPSULE ORAL
Status: DISCONTINUED | OUTPATIENT
Start: 2024-11-21 | End: 2024-11-24 | Stop reason: HOSPADM

## 2024-11-21 RX ORDER — NICOTINE 21 MG/24HR
1 PATCH, TRANSDERMAL 24 HOURS TRANSDERMAL DAILY PRN
Status: DISCONTINUED | OUTPATIENT
Start: 2024-11-21 | End: 2024-11-24 | Stop reason: HOSPADM

## 2024-11-21 RX ORDER — HALOPERIDOL 5 MG/1
5 TABLET ORAL EVERY 4 HOURS PRN
Status: DISCONTINUED | OUTPATIENT
Start: 2024-11-21 | End: 2024-11-24 | Stop reason: HOSPADM

## 2024-11-21 RX ORDER — ALUMINA, MAGNESIA, AND SIMETHICONE 2400; 2400; 240 MG/30ML; MG/30ML; MG/30ML
15 SUSPENSION ORAL EVERY 6 HOURS PRN
Status: CANCELLED | OUTPATIENT
Start: 2024-11-21

## 2024-11-21 RX ORDER — ALUMINA, MAGNESIA, AND SIMETHICONE 2400; 2400; 240 MG/30ML; MG/30ML; MG/30ML
15 SUSPENSION ORAL EVERY 6 HOURS PRN
Status: DISCONTINUED | OUTPATIENT
Start: 2024-11-21 | End: 2024-11-24 | Stop reason: HOSPADM

## 2024-11-21 RX ORDER — LOPERAMIDE HYDROCHLORIDE 2 MG/1
2 CAPSULE ORAL
Status: CANCELLED | OUTPATIENT
Start: 2024-11-21

## 2024-11-21 RX ORDER — ACETAMINOPHEN 325 MG/1
650 TABLET ORAL EVERY 6 HOURS PRN
Status: DISCONTINUED | OUTPATIENT
Start: 2024-11-21 | End: 2024-11-24 | Stop reason: HOSPADM

## 2024-11-21 RX ORDER — TRAZODONE HYDROCHLORIDE 100 MG/1
100 TABLET ORAL NIGHTLY PRN
Status: DISCONTINUED | OUTPATIENT
Start: 2024-11-21 | End: 2024-11-22

## 2024-11-21 RX ORDER — DIPHENHYDRAMINE HCL 50 MG
50 CAPSULE ORAL EVERY 4 HOURS PRN
Status: CANCELLED | OUTPATIENT
Start: 2024-11-21

## 2024-11-21 RX ORDER — DIPHENHYDRAMINE HYDROCHLORIDE 50 MG/ML
50 INJECTION INTRAMUSCULAR; INTRAVENOUS EVERY 4 HOURS PRN
Status: CANCELLED | OUTPATIENT
Start: 2024-11-21

## 2024-11-21 RX ORDER — LORAZEPAM 2 MG/1
2 TABLET ORAL EVERY 4 HOURS PRN
Status: DISCONTINUED | OUTPATIENT
Start: 2024-11-21 | End: 2024-11-24 | Stop reason: HOSPADM

## 2024-11-21 RX ORDER — DIPHENHYDRAMINE HCL 50 MG
50 CAPSULE ORAL EVERY 4 HOURS PRN
Status: DISCONTINUED | OUTPATIENT
Start: 2024-11-21 | End: 2024-11-24 | Stop reason: HOSPADM

## 2024-11-21 RX ORDER — LORAZEPAM 2 MG/ML
2 INJECTION INTRAMUSCULAR EVERY 4 HOURS PRN
Status: DISCONTINUED | OUTPATIENT
Start: 2024-11-21 | End: 2024-11-24 | Stop reason: HOSPADM

## 2024-11-21 RX ORDER — TRAZODONE HYDROCHLORIDE 100 MG/1
100 TABLET ORAL NIGHTLY PRN
Status: CANCELLED | OUTPATIENT
Start: 2024-11-21

## 2024-11-21 RX ORDER — HYDROXYZINE HYDROCHLORIDE 25 MG/1
50 TABLET, FILM COATED ORAL EVERY 6 HOURS PRN
Status: DISCONTINUED | OUTPATIENT
Start: 2024-11-21 | End: 2024-11-24 | Stop reason: HOSPADM

## 2024-11-21 RX ORDER — HALOPERIDOL 5 MG/1
5 TABLET ORAL EVERY 4 HOURS PRN
Status: CANCELLED | OUTPATIENT
Start: 2024-11-21

## 2024-11-21 RX ORDER — ACETAMINOPHEN 325 MG/1
650 TABLET ORAL EVERY 6 HOURS PRN
Status: CANCELLED | OUTPATIENT
Start: 2024-11-21

## 2024-11-21 RX ORDER — HALOPERIDOL 5 MG/ML
5 INJECTION INTRAMUSCULAR EVERY 4 HOURS PRN
Status: DISCONTINUED | OUTPATIENT
Start: 2024-11-21 | End: 2024-11-24 | Stop reason: HOSPADM

## 2024-11-21 RX ORDER — LORAZEPAM 2 MG/ML
2 INJECTION INTRAMUSCULAR EVERY 4 HOURS PRN
Status: CANCELLED | OUTPATIENT
Start: 2024-11-21 | End: 2024-11-26

## 2024-11-21 RX ORDER — HYDROXYZINE HYDROCHLORIDE 25 MG/1
50 TABLET, FILM COATED ORAL EVERY 6 HOURS PRN
Status: CANCELLED | OUTPATIENT
Start: 2024-11-21

## 2024-11-21 RX ORDER — DIPHENHYDRAMINE HYDROCHLORIDE 50 MG/ML
50 INJECTION INTRAMUSCULAR; INTRAVENOUS EVERY 4 HOURS PRN
Status: DISCONTINUED | OUTPATIENT
Start: 2024-11-21 | End: 2024-11-24 | Stop reason: HOSPADM

## 2024-11-21 RX ORDER — HALOPERIDOL 5 MG/ML
5 INJECTION INTRAMUSCULAR EVERY 4 HOURS PRN
Status: CANCELLED | OUTPATIENT
Start: 2024-11-21

## 2024-11-21 RX ORDER — LORAZEPAM 2 MG/1
2 TABLET ORAL EVERY 4 HOURS PRN
Status: CANCELLED | OUTPATIENT
Start: 2024-11-21 | End: 2024-11-28

## 2024-11-21 RX ORDER — NICOTINE 21 MG/24HR
1 PATCH, TRANSDERMAL 24 HOURS TRANSDERMAL DAILY PRN
Status: CANCELLED | OUTPATIENT
Start: 2024-11-21

## 2024-11-21 RX ADMIN — HYDROXYZINE HYDROCHLORIDE 50 MG: 25 TABLET, FILM COATED ORAL at 11:24

## 2024-11-21 NOTE — NURSING NOTE
50 y/o female pt. Was a direct admit from [facility] to the lifespring unit at 1100. Pt. On arrival was searched by female staff for sharps and contraband. Per report pt.  Received in the mail a letter from hospice that triggered her suicidal thoughts over the recent loss of her  and that she wanted to die and felt overwhelmed with the loss of her . And was willing to get help. Pt. Is still having suicidal thoughts, but is sirena for mgMEDIA, orders were in from dr. Urbina for no need for CW1:1 due to no acute suicdal thoughts. Pt. Denied any hi/avh and denied any substance abuse. Pt. Was cooperative with sad affect and c/o anxiety and was willing to take prn atarax for anxiety and is at this time resting in bed. Will con't to monitor and provide a safe environment

## 2024-11-21 NOTE — PLAN OF CARE
Goal Outcome Evaluation:                                      Care plan entered and reviewed by this RN. Patient calm and cooperative upon arrival. Currently in admission interview with primary nurse. See admission note. Physical completed by this RN.

## 2024-11-22 LAB — BACTERIA SPEC AEROBE CULT: NORMAL

## 2024-11-22 RX ORDER — MELOXICAM 7.5 MG/1
15 TABLET ORAL DAILY
Status: DISCONTINUED | OUTPATIENT
Start: 2024-11-22 | End: 2024-11-22

## 2024-11-22 RX ORDER — LEVOTHYROXINE SODIUM 25 UG/1
75 TABLET ORAL
Status: DISCONTINUED | OUTPATIENT
Start: 2024-11-22 | End: 2024-11-24 | Stop reason: HOSPADM

## 2024-11-22 RX ORDER — LISINOPRIL 10 MG/1
10 TABLET ORAL DAILY
Status: DISCONTINUED | OUTPATIENT
Start: 2024-11-22 | End: 2024-11-24 | Stop reason: HOSPADM

## 2024-11-22 RX ORDER — ARIPIPRAZOLE 5 MG/1
5 TABLET ORAL DAILY
Status: DISCONTINUED | OUTPATIENT
Start: 2024-11-22 | End: 2024-11-24 | Stop reason: HOSPADM

## 2024-11-22 RX ORDER — VENLAFAXINE HYDROCHLORIDE 150 MG/1
150 CAPSULE, EXTENDED RELEASE ORAL
Status: DISCONTINUED | OUTPATIENT
Start: 2024-11-22 | End: 2024-11-24 | Stop reason: HOSPADM

## 2024-11-22 RX ORDER — CYCLOBENZAPRINE HCL 10 MG
10 TABLET ORAL 3 TIMES DAILY PRN
Status: DISCONTINUED | OUTPATIENT
Start: 2024-11-22 | End: 2024-11-24 | Stop reason: HOSPADM

## 2024-11-22 RX ORDER — HYDROXYZINE HYDROCHLORIDE 25 MG/1
100 TABLET, FILM COATED ORAL NIGHTLY PRN
Status: DISCONTINUED | OUTPATIENT
Start: 2024-11-22 | End: 2024-11-24 | Stop reason: HOSPADM

## 2024-11-22 RX ADMIN — ARIPIPRAZOLE 5 MG: 5 TABLET ORAL at 09:46

## 2024-11-22 RX ADMIN — LISINOPRIL 10 MG: 10 TABLET ORAL at 09:46

## 2024-11-22 RX ADMIN — LEVOTHYROXINE SODIUM 75 MCG: 0.03 TABLET ORAL at 10:18

## 2024-11-22 RX ADMIN — VENLAFAXINE HYDROCHLORIDE 150 MG: 150 CAPSULE, EXTENDED RELEASE ORAL at 09:46

## 2024-11-22 RX ADMIN — HYDROXYZINE HYDROCHLORIDE 100 MG: 25 TABLET, FILM COATED ORAL at 21:16

## 2024-11-22 NOTE — PLAN OF CARE
Goal Outcome Evaluation:  Plan of Care Reviewed With: patient  Patient Agreement with Plan of Care: agrees     PT HAS JULISA AOX4, ABLE TO MAKE NEEDS KNOWN, EASY TO ENGAGE, GOOD EYE CONTACT, CLEAR SPEECH, PT DENIES SI/HI AND AVH, RATES DEPRESSION 8/10 AND DEPRESSION 8/10, PT IS TEARFUL AT TIMES, COOPERATIVE, PLEASANT , CONTRACTS FOR SAFETY, PT STATES SHE IS GOING TO ASK DOCTOR TO BE DISCHARGED 11/23, PT DID NOT SIGN REQUEST FOR DISCHARGE FORM BUT WAS OFFERED FORM, NO S/S OF DISTRESS AT THIS TIME.
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
## 2024-11-22 NOTE — PLAN OF CARE
Goal Outcome Evaluation:  Plan of Care Reviewed With: patient  Patient Agreement with Plan of Care: agrees   Pt up to dayroom, ate snack in her room, withdrawn to self but makes needs known. Pt stated that she feels “numb” after taking atarax this afternoon. Pt stated that she still feels all of her emotions and like she would want to cry but is unable to. Pt rated anxiety and depression 10. Pt stated that she has been feeling suicidal for the last three days, had a plan to overdose on her medications. Pt contracted for safety while here on LS. Pt talked about participating in online support groups, seeing someone at [facility] weekly. Pt reported feeling like she has a yeast infection, utilizing wipes to wipe, educated on not flushing wipes. Pt given paper bag to throw wipes away. Pt denied HI, AVH. Pt slept all night.

## 2024-11-23 RX ADMIN — HYDROXYZINE HYDROCHLORIDE 100 MG: 25 TABLET, FILM COATED ORAL at 20:05

## 2024-11-23 RX ADMIN — LISINOPRIL 10 MG: 10 TABLET ORAL at 08:38

## 2024-11-23 RX ADMIN — VENLAFAXINE HYDROCHLORIDE 150 MG: 150 CAPSULE, EXTENDED RELEASE ORAL at 08:38

## 2024-11-23 RX ADMIN — ARIPIPRAZOLE 5 MG: 5 TABLET ORAL at 08:38

## 2024-11-23 RX ADMIN — ACETAMINOPHEN 650 MG: 325 TABLET ORAL at 20:06

## 2024-11-23 RX ADMIN — LEVOTHYROXINE SODIUM 75 MCG: 0.03 TABLET ORAL at 06:26

## 2024-11-23 NOTE — PLAN OF CARE
"Goal Outcome Evaluation:  The patient is alert, oriented, not in distress. The patient denies current SI, HI or AVH. The patient rated her anxiety and depression 0/10. The patient explained that her day was \"better.\" The patient did have atarax at bedtime. The patient continued to be withdrawn to her room, coming out to use the phone. Care of this patient is ongoing. -- AS RN  "

## 2024-11-23 NOTE — PROGRESS NOTES
Psychiatry Progress Note    11/23/2024    Legal Status: Voluntary    Chief Complaint: I am doing better it was just the stress    Subjective:    The patient was seen, discussed with nursing staff, recent clinical data reviewed.  Per nursing report: The patient had good appetite, no adverse behavior, was appropriate on approach.  On my assessment the patient reported feeling better, denied active suicidal ideations, acknowledged that he has she had 1 prior suicidal attempt this year.  The patient believes that the reason she was admitted to the hospital was related to the stress but she had enough time to think about it and now is getting better.  The patient denied side effects from current medication regiment.  The patient reported sleeping on and off last night but added that her sleep was better compared to how she slept before she came to the hospital.      Vital Signs    Vitals:    11/21/24 2000 11/22/24 0908 11/22/24 1957 11/23/24 0805   BP: 121/75 138/100 124/74 124/75   BP Location: Left arm Left arm Left arm Left arm   Patient Position: Lying Sitting Lying Sitting   Pulse: 105 99 80 90   Resp: 16 20 16 16   Temp: 98.6 °F (37 °C) 97.7 °F (36.5 °C) 97.5 °F (36.4 °C) 97.7 °F (36.5 °C)   TempSrc: Oral Oral Oral Oral   SpO2: 100% 100% 99% 100%   Weight:       Height:           Mental Status Exam:   49-year-old  female appropriately groomed, appears his stated age.  The patient was calm and cooperative.  Hygiene: Good eye contact: Good.  The patient described her mood as better affect mildly restricted.  Speech was normal tone and rhythm.  Thought process: Linear.  Thought content: The patient denied having active suicidal ideations, however admitted prior to coming to the hospital she thought about overdosing on her medications.  The patient denied homicidal ideations, did not endorse any delusional statement, denied abnormal perceptions.  Memory: Intact.  Abstract ability: Preserved.  Insight and  judgment: Presented.    Lab Results: Results source: EMR   Lab Results (last 24 hours)       Procedure Component Value Units Date/Time    Urine Culture - Urine, Urine, Clean Catch [[id number]] Collected: 11/21/24 1911    Specimen: Urine, Clean Catch Updated: 11/22/24 2338     Urine Culture 25,000 CFU/mL Mixed [place] Isolated    Narrative:      Specimen contains mixed organisms of questionable pathogenicity suggestive of contamination. If symptoms persist, suggest recollection.  Colonization of the urinary tract without infection is common. Treatment is discouraged unless the patient is symptomatic, pregnant, or undergoing an invasive urologic procedure.            Radiology Results:  Imaging Results (Last 24 Hours)       ** No results found for the last 24 hours. **            Medicine:   Current Facility-Administered Medications:     acetaminophen (TYLENOL) tablet 650 mg, 650 mg, Oral, Q6H PRN, Eldon Urbina MD    aluminum-magnesium hydroxide-simethicone (MAALOX MAX) 400-400-40 MG/5ML suspension 15 mL, 15 mL, Oral, Q6H PRN, Eldon Urbina MD    ARIPiprazole (ABILIFY) tablet 5 mg, 5 mg, Oral, Daily, Eldon Urbina MD, 5 mg at 11/23/24 0838    cyclobenzaprine (FLEXERIL) tablet 10 mg, 10 mg, Oral, TID PRN, Eldon Urbina MD    LORazepam (ATIVAN) tablet 2 mg, 2 mg, Oral, Q4H PRN **AND** haloperidol (HALDOL) tablet 5 mg, 5 mg, Oral, Q4H PRN **AND** diphenhydrAMINE (BENADRYL) capsule 50 mg, 50 mg, Oral, Q4H PRN, Eldon Urbina MD    haloperidol lactate (HALDOL) injection 5 mg, 5 mg, Intramuscular, Q4H PRN **AND** diphenhydrAMINE (BENADRYL) injection 50 mg, 50 mg, Intramuscular, Q4H PRN **AND** LORazepam (ATIVAN) injection 2 mg, 2 mg, Intramuscular, Q4H PRN, Eldon Urbina MD    hydrOXYzine (ATARAX) tablet 100 mg, 100 mg, Oral, Nightly PRN, Eldon Urbina MD, 100 mg at 11/22/24 2116    hydrOXYzine (ATARAX) tablet 50 mg, 50 mg, Oral, Q6H PRN, Eldon Urbina MD, 50 mg at 11/21/24 1124    levothyroxine (SYNTHROID, LEVOTHROID) tablet 75 mcg,  75 mcg, Oral, Q AM, Eldon Urbina MD, 75 mcg at 11/23/24 0626    lisinopril (PRINIVIL,ZESTRIL) tablet 10 mg, 10 mg, Oral, Daily, Eldon Urbina MD, 10 mg at 11/23/24 0838    loperamide (IMODIUM) capsule 2 mg, 2 mg, Oral, Q2H PRN, Eldon Urbina MD    magnesium hydroxide (MILK OF MAGNESIA) suspension 10 mL, 10 mL, Oral, Daily PRN, Eldon Urbina MD    nicotine (NICODERM CQ) 21 MG/24HR patch 1 patch, 1 patch, Transdermal, Daily PRN, Eldon Urbina MD    venlafaxine XR (EFFEXOR-XR) 24 hr capsule 150 mg, 150 mg, Oral, Daily With Breakfast, Eldon Urbina MD, 150 mg at 11/23/24 0838    Diagnoses/Assessment:     Severe recurrent major depression without psychotic features      Treatment Plan:    1) Will continue care for the patient on the behavioral health unit at [facility] to ensure patient safety.  2) Will continue to provide treatment with the unit milieu, activities, therapies and psychopharmacological management.  3) supportive approach with focus on abatement of suicidal ideations and mood stability, continue current medication regiment, titrate as indicated.  4) will try to obtain collateral information.  5) work on appropriate discharge planning, follow-up and disposition  Projected length of stay 2 to 3 days    Treatment plan and medication risks and benefits discussed with: Patient    Swati Scales MD  11/23/24 at 10:14 EST

## 2024-11-23 NOTE — PLAN OF CARE
Goal Outcome Evaluation:  Plan of Care Reviewed With: patient  Patient Agreement with Plan of Care: agrees         PT HAS BEEN AOX4, ABLE TO MAKE NEEDS KNOWN, EASY TO ENGAGE, GOOD EYE CONTACT, CLEAR SPEECH, PT DENIES SI/HI AND AVH, RATES DEPRESSION 0 AND ANXIETY, PT IS TEARFUL AT TIMES, COOPERATIVE, PLEASANT , CONTRACTS FOR SAFETY, PT WANTS TO BE DISCHARGED 11/24, PT DID NOT SIGN REQUEST FOR DISCHARGE FORM BUT WAS OFFERED FORM, NO S/S OF DISTRESS AT THIS TIME. PT PARTICIPATED IN GROUP AND EATING MEAL WITH NO ISSUES.

## 2024-11-24 VITALS
BODY MASS INDEX: 38.28 KG/M2 | HEART RATE: 93 BPM | OXYGEN SATURATION: 100 % | TEMPERATURE: 97.5 F | WEIGHT: 216.05 LBS | RESPIRATION RATE: 16 BRPM | DIASTOLIC BLOOD PRESSURE: 77 MMHG | HEIGHT: 63 IN | SYSTOLIC BLOOD PRESSURE: 118 MMHG

## 2024-11-24 RX ORDER — VENLAFAXINE HYDROCHLORIDE 150 MG/1
150 CAPSULE, EXTENDED RELEASE ORAL
Qty: 30 CAPSULE | Refills: 0 | Status: SHIPPED | OUTPATIENT
Start: 2024-11-25

## 2024-11-24 RX ADMIN — LEVOTHYROXINE SODIUM 75 MCG: 0.03 TABLET ORAL at 05:29

## 2024-11-24 RX ADMIN — LISINOPRIL 10 MG: 10 TABLET ORAL at 08:20

## 2024-11-24 RX ADMIN — VENLAFAXINE HYDROCHLORIDE 150 MG: 150 CAPSULE, EXTENDED RELEASE ORAL at 08:20

## 2024-11-24 RX ADMIN — ARIPIPRAZOLE 5 MG: 5 TABLET ORAL at 08:19

## 2024-11-24 NOTE — DISCHARGE INSTR - APPOINTMENTS
Pt candelaria [facility] and has appt Dec 6.

## 2024-11-24 NOTE — PLAN OF CARE
Goal Outcome Evaluation:  Plan of Care Reviewed With: patient  Patient Agreement with Plan of Care: agrees    Pt has been AOX4, able to make needs known, clear speech, good eye contact, easy to engage, pt denies SI/HI and denies A/V/H, pt denies anxiety and depression, pt is being discharged home and has ride arranged for pickup, pt is following up with [facility] and has appointment scheduled Dec 6 @ 2pm.

## 2024-11-24 NOTE — DISCHARGE SUMMARY
[facility]         DISCHARGE SUMMARY    Patient Name: Kerry Foster  : 1975  MRN: [id number]    Date of Admission: 2024  Date of Discharge:  2024  Primary Care Physician: Catrachita Rojas APRN    Consults       No orders found from 10/23/2024 to 2024.            Presenting Problem:   Severe recurrent major depression without psychotic features [F33.2]    Active and Resolved Hospital Problems:  Active Hospital Problems    Diagnosis POA   • **Severe recurrent major depression without psychotic features [F33.2] Yes      Resolved Hospital Problems   No resolved problems to display.         Hospital Course     Hospital Course:  Kerry Foster is a 49 y.o. female who was admitted to [place] unit after the patient told her therapist during the outpatient appointment that she was experiencing suicidal ideations with a plan to overdose on her medications.  The patient was admitted on voluntary status, dose of Effexor was increased to 150 mg daily.  The patient has been compliant with scheduled medication regiment the symptoms of depression improved, the patient denied experiencing any suicidal ideations or desire to die.  The patient has been visible in the milieu and participated in the activities that were available on the unit.        On day of discharge patient is calm, and has no acute agitation or restlessness.  Speech is clear and language is appropriate.  Mood is described as much better and affect is bright.  Thought processes are linear.  Thought content is future oriented, denied suicidal homicidal ideations.  Insight is good, and judgment is appropriate.      DISCHARGE Follow Up Recommendations for labs and diagnostics: Low TSH, recommended to f/u with PCP      Day of Discharge     Vital Signs:  Temp:  [97.3 °F (36.3 °C)-97.5 °F (36.4 °C)] 97.5 °F (36.4 °C)  Heart Rate:  [85-93] 93  Resp:  [16] 16  BP: (112-118)/(66-77) 118/77      Pertinent  and/or  Most Recent Results     LAB RESULTS:      Lab 11/21/24  1233   WBC 8.64   HEMOGLOBIN 12.9   HEMATOCRIT 40.5   PLATELETS 401   NEUTROS ABS 5.99   IMMATURE GRANS (ABS) 0.02   LYMPHS ABS 1.87   MONOS ABS 0.66   EOS ABS 0.08   MCV 93.8         Lab 11/21/24  1233   SODIUM 142   POTASSIUM 3.6   CHLORIDE 105   CO2 24.4   ANION GAP 12.6   BUN 12   CREATININE 0.64   EGFR 109.2   GLUCOSE 84   CALCIUM 9.6   TSH 0.040*         Lab 11/21/24  1233   TOTAL PROTEIN 7.8   ALBUMIN 4.1   GLOBULIN 3.7   ALT (SGPT) 42*   AST (SGOT) 31   BILIRUBIN 0.2   ALK PHOS 90                                     Lab 11/21/24  1233   ETHANOL PCT <0.010   ETHANOL MGDL <10         Lab 11/21/24  1911   BENZODIAZEPINE SCREEN, URINE Negative   COCAINE SCREEN, URINE Negative   OPIATES Negative   THC URINE SCREEN Negative   METHADONE SCREEN, URINE Negative     Brief Urine Lab Results  (Last result in the past 365 days)        Color   Clarity   Blood   Leuk Est   Nitrite   Protein   CREAT   Urine HCG        11/21/24 1911 Yellow   Clear   Negative   Small (1+)   Negative   Negative                                       Imaging Results (Last 7 Days)       ** No results found for the last 168 hours. **             Labs Pending at Discharge:           Discharge Details        Discharge Medications        Changes to Medications        Instructions Start Date   venlafaxine  MG 24 hr capsule  Commonly known as: EFFEXOR-XR  What changed:   medication strength  how much to take   150 mg, Oral, Daily With Breakfast   Start Date: November 25, 2024            Continue These Medications        Instructions Start Date   levothyroxine 150 MCG tablet  Commonly known as: SYNTHROID, LEVOTHROID   150 mcg, Oral, Every Early Morning      lisinopril 10 MG tablet  Commonly known as: PRINIVIL,ZESTRIL   10 mg, Daily             ASK your doctor about these medications        Instructions Start Date   ARIPiprazole 5 MG tablet  Commonly known as: ABILIFY   5 mg, Oral, Daily       cyclobenzaprine 10 MG tablet  Commonly known as: FLEXERIL   10 mg, Oral, 3 Times Daily      meloxicam 15 MG tablet  Commonly known as: MOBIC   15 mg, Daily      traZODone 50 MG tablet  Commonly known as: DESYREL   50 mg, Oral, Nightly PRN               Allergies   Allergen Reactions   • Codeine Hives   • Morphine Anaphylaxis         Discharge Disposition:  Home or Self Care    Diet:  Hospital:  Diet Order   Procedures   • Diet: Regular/House; Fluid Consistency: Thin (IDDSI 0)         Discharge Activity: Ad julián.      Discharge Condition: Stable    CODE STATUS:  Code Status and Medical Interventions: CPR (Attempt to Resuscitate); Full Support   Ordered at: 11/21/24 1117     Code Status (Patient has no pulse and is not breathing):    CPR (Attempt to Resuscitate)     Medical Interventions (Patient has pulse or is breathing):    Full Support         No future appointments.        Time spent on Discharge including face to face service:  30 minutes    Part of this note may be an electronic transcription/translation of spoken language to printed text using the Dragon dictation system.        Electronically signed by Swati Scales MD, 11/24/24, 9:26 AM EST.

## 2024-11-24 NOTE — PLAN OF CARE
Goal Outcome Evaluation:    The patient is alert, oriented, not in distress. The patient complained of a headache and was given tylenol. The patient was withdrawn to self but out in the dayroom and in the hallway to use the patient phone. The patient rated her anxiety and depression 1/10. The patient denied current SI, HI or AVH. Care of this patient is ongoing. -- AS RN

## 2024-12-18 ENCOUNTER — OFFICE VISIT (OUTPATIENT)
Dept: FAMILY MEDICINE CLINIC | Facility: CLINIC | Age: 49
End: 2024-12-18
Payer: MEDICAID

## 2024-12-18 ENCOUNTER — LAB (OUTPATIENT)
Dept: LAB | Facility: HOSPITAL | Age: 49
End: 2024-12-18
Payer: MEDICAID

## 2024-12-18 VITALS
OXYGEN SATURATION: 96 % | HEIGHT: 63 IN | HEART RATE: 96 BPM | WEIGHT: 213.8 LBS | SYSTOLIC BLOOD PRESSURE: 116 MMHG | DIASTOLIC BLOOD PRESSURE: 83 MMHG | BODY MASS INDEX: 37.88 KG/M2

## 2024-12-18 DIAGNOSIS — R10.13 EPIGASTRIC PAIN: Primary | ICD-10-CM

## 2024-12-18 DIAGNOSIS — Z12.11 SCREENING FOR MALIGNANT NEOPLASM OF COLON: ICD-10-CM

## 2024-12-18 DIAGNOSIS — R10.13 EPIGASTRIC PAIN: ICD-10-CM

## 2024-12-18 DIAGNOSIS — Z13.220 LIPID SCREENING: ICD-10-CM

## 2024-12-18 DIAGNOSIS — I10 ESSENTIAL HYPERTENSION: ICD-10-CM

## 2024-12-18 DIAGNOSIS — E03.9 ACQUIRED HYPOTHYROIDISM: ICD-10-CM

## 2024-12-18 DIAGNOSIS — R13.19 ESOPHAGEAL DYSPHAGIA: ICD-10-CM

## 2024-12-18 DIAGNOSIS — Z12.31 ENCOUNTER FOR SCREENING MAMMOGRAM FOR MALIGNANT NEOPLASM OF BREAST: ICD-10-CM

## 2024-12-18 LAB
ALBUMIN SERPL-MCNC: 4.2 G/DL (ref 3.5–5.2)
ALBUMIN/GLOB SERPL: 1.2 G/DL
ALP SERPL-CCNC: 87 U/L (ref 39–117)
ALT SERPL W P-5'-P-CCNC: 19 U/L (ref 1–33)
ANION GAP SERPL CALCULATED.3IONS-SCNC: 9 MMOL/L (ref 5–15)
AST SERPL-CCNC: 22 U/L (ref 1–32)
BASOPHILS # BLD AUTO: 0.03 10*3/MM3 (ref 0–0.2)
BASOPHILS NFR BLD AUTO: 0.4 % (ref 0–1.5)
BILIRUB SERPL-MCNC: 0.2 MG/DL (ref 0–1.2)
BUN SERPL-MCNC: 9 MG/DL (ref 6–20)
BUN/CREAT SERPL: 11.8 (ref 7–25)
CALCIUM SPEC-SCNC: 10 MG/DL (ref 8.6–10.5)
CHLORIDE SERPL-SCNC: 106 MMOL/L (ref 98–107)
CHOLEST SERPL-MCNC: 216 MG/DL (ref 0–200)
CO2 SERPL-SCNC: 26 MMOL/L (ref 22–29)
CREAT SERPL-MCNC: 0.76 MG/DL (ref 0.57–1)
DEPRECATED RDW RBC AUTO: 41.8 FL (ref 37–54)
EGFRCR SERPLBLD CKD-EPI 2021: 96.8 ML/MIN/1.73
EOSINOPHIL # BLD AUTO: 0.12 10*3/MM3 (ref 0–0.4)
EOSINOPHIL NFR BLD AUTO: 1.5 % (ref 0.3–6.2)
ERYTHROCYTE [DISTWIDTH] IN BLOOD BY AUTOMATED COUNT: 12.3 % (ref 12.3–15.4)
GLOBULIN UR ELPH-MCNC: 3.5 GM/DL
GLUCOSE SERPL-MCNC: 106 MG/DL (ref 65–99)
HCT VFR BLD AUTO: 39.6 % (ref 34–46.6)
HDLC SERPL-MCNC: 46 MG/DL (ref 40–60)
HGB BLD-MCNC: 13.2 G/DL (ref 12–15.9)
IMM GRANULOCYTES # BLD AUTO: 0.02 10*3/MM3 (ref 0–0.05)
IMM GRANULOCYTES NFR BLD AUTO: 0.2 % (ref 0–0.5)
LDLC SERPL CALC-MCNC: 134 MG/DL (ref 0–100)
LDLC/HDLC SERPL: 2.82 {RATIO}
LIPASE SERPL-CCNC: 115 U/L (ref 13–60)
LYMPHOCYTES # BLD AUTO: 1.46 10*3/MM3 (ref 0.7–3.1)
LYMPHOCYTES NFR BLD AUTO: 17.7 % (ref 19.6–45.3)
MCH RBC QN AUTO: 30.6 PG (ref 26.6–33)
MCHC RBC AUTO-ENTMCNC: 33.3 G/DL (ref 31.5–35.7)
MCV RBC AUTO: 91.7 FL (ref 79–97)
MONOCYTES # BLD AUTO: 0.51 10*3/MM3 (ref 0.1–0.9)
MONOCYTES NFR BLD AUTO: 6.2 % (ref 5–12)
NEUTROPHILS NFR BLD AUTO: 6.13 10*3/MM3 (ref 1.7–7)
NEUTROPHILS NFR BLD AUTO: 74 % (ref 42.7–76)
NRBC BLD AUTO-RTO: 0 /100 WBC (ref 0–0.2)
PLATELET # BLD AUTO: 426 10*3/MM3 (ref 140–450)
PMV BLD AUTO: 9.6 FL (ref 6–12)
POTASSIUM SERPL-SCNC: 4.8 MMOL/L (ref 3.5–5.2)
PROT SERPL-MCNC: 7.7 G/DL (ref 6–8.5)
RBC # BLD AUTO: 4.32 10*6/MM3 (ref 3.77–5.28)
SODIUM SERPL-SCNC: 141 MMOL/L (ref 136–145)
TRIGL SERPL-MCNC: 201 MG/DL (ref 0–150)
VLDLC SERPL-MCNC: 36 MG/DL (ref 5–40)
WBC NRBC COR # BLD AUTO: 8.27 10*3/MM3 (ref 3.4–10.8)

## 2024-12-18 PROCEDURE — 80053 COMPREHEN METABOLIC PANEL: CPT

## 2024-12-18 PROCEDURE — 36415 COLL VENOUS BLD VENIPUNCTURE: CPT

## 2024-12-18 PROCEDURE — 85025 COMPLETE CBC W/AUTO DIFF WBC: CPT

## 2024-12-18 PROCEDURE — 83690 ASSAY OF LIPASE: CPT

## 2024-12-18 PROCEDURE — 80061 LIPID PANEL: CPT

## 2024-12-18 PROCEDURE — 3074F SYST BP LT 130 MM HG: CPT | Performed by: FAMILY MEDICINE

## 2024-12-18 PROCEDURE — 3079F DIAST BP 80-89 MM HG: CPT | Performed by: FAMILY MEDICINE

## 2024-12-18 PROCEDURE — 99214 OFFICE O/P EST MOD 30 MIN: CPT | Performed by: FAMILY MEDICINE

## 2024-12-18 RX ORDER — LEVOTHYROXINE SODIUM 137 UG/1
150 TABLET ORAL
Qty: 90 TABLET | Refills: 0 | Status: SHIPPED | OUTPATIENT
Start: 2024-12-18

## 2024-12-18 RX ORDER — ONDANSETRON 4 MG/1
4 TABLET, ORALLY DISINTEGRATING ORAL EVERY 8 HOURS PRN
Qty: 30 TABLET | Refills: 0 | Status: SHIPPED | OUTPATIENT
Start: 2024-12-18

## 2024-12-18 NOTE — PROGRESS NOTES
Chief Complaint  Abdominal Pain and Flank Pain    Subjective     Problem List  Visit Diagnosis   Encounters  Notes  Medications  Labs  Result Review Imaging  Media    Kerry Foster presents to [facility]  History of Present Illness    History of Present Illness  The patient is a 49-year-old female who presents to [facility] care, having issues with abdominal pain and dysphagia.    She has been experiencing persistent abdominal pain for several weeks, which has progressively worsened. The pain is described as constant epigastric pain, with intermittent sharp shooting pain from the right upper quadrant that radiates back to the epigastric area. She rates her pain as a 6 on a scale of 10 currently, but notes that the intermittent severe pain will last for couple hours at a time. She reports difficulty in swallowing food since the onset of the pain.  She does have a history of dysphagia, status post 6 esophageal dilations in the past, patient recalls being told that she has a hiatal hernia, is not on any proton pump therapy but does use Tums as needed for heartburn.  She notes symptoms are accompanied by nausea, lightheadedness, and dizziness at times. She does not have any history of pancreatic or gastric issues. She is not currently on any medication for gastritis and reports no episodes of vomiting or presence of black or bloody stools. She is unsure about her last lab work. The pain is so severe that it disrupts her sleep and forces her to hold her stomach constantly. She has not previously taken omeprazole. She keeps Tums chewable by her bed and takes them as needed, but they do not provide relief. Tylenol is also ineffective in managing her pain. She does not have any antiemetic medication at home. She underwent cholecystectomy in 1995 and likens the current pain to that experienced pre--surgery.     She has a history of dysphagia and has previously consulted a  "gastroenterologist for this issue. She has undergone six esophageal dilations, with the most recent one performed in 2020. She has been diagnosed with a small hernia.    Her thyroid levels have been fluctuating recently. Her TSH was high 2 months ago and low 3 weeks ago. Her levothyroxine dose was not adjusted after that. She is still on the same dose that she was 3 weeks ago. She is unsure what type of hypothyroidism she was diagnosed with. She has not been tested for any autoimmune conditions. She has not had any kind of ablative therapy or surgery. She notes that her hair falls out when she takes levothyroxine, but when she does not take it, her hair is fine. When her thyroid was first problematic, she was on such a high dose that her hair came out in clumps to the point where her head was shaved. She did not see an endocrinologist at that time.    She has a clip in her right breast and is behind on her mammogram. She has never had colon cancer screening. She is interested in getting it done. She has a family history of cancer. She has not seen a GI doctor recently.    Supplemental Information  She has a history of severe anxiety, which has worsened over the past month. She was recently admitted to [facility] following a suicide attempt. She is unsure if her anxiety is related to her thyroid condition.    FAMILY HISTORY  Her maternal aunt passed away from colon cancer. Another maternal aunt started with colon cancer and ended up with another type of cancer. Her maternal grandmother, 3 aunts, and 1 uncle had cancer. There is a history of cancer on her father's side as well.    MEDICATIONS  Current: Levothyroxine, Tums, Tylenol.       Objective   Vital Signs:   /83 (BP Location: Right arm, Patient Position: Sitting, Cuff Size: Large Adult)   Pulse 96   Ht 160 cm (62.99\")   Wt 97 kg (213 lb 12.8 oz)   SpO2 96%   BMI 37.88 kg/m²     Physical Exam  Vitals reviewed.   Constitutional:       General: She is " not in acute distress.     Appearance: Normal appearance.   HENT:      Head: Normocephalic and atraumatic.      Mouth/Throat:      Mouth: Mucous membranes are moist.   Eyes:      Conjunctiva/sclera: Conjunctivae normal.   Cardiovascular:      Rate and Rhythm: Normal rate and regular rhythm.      Heart sounds: Normal heart sounds.   Pulmonary:      Effort: Pulmonary effort is normal.      Breath sounds: Normal breath sounds.   Abdominal:      General: There is no distension.      Palpations: Abdomen is soft.      Tenderness: There is abdominal tenderness in the right upper quadrant and epigastric area. There is no rebound.      Comments: The more constant pain is noted in the epigastric area and is tender to touch, also tender to touch in the right upper quadrant but patient notes that this pain is intermittent.  No flank pain.    Musculoskeletal:         General: No swelling.      Cervical back: Normal range of motion and neck supple.   Skin:     General: Skin is warm.   Neurological:      General: No focal deficit present.      Mental Status: She is alert.   Psychiatric:         Mood and Affect: Mood normal.         Behavior: Behavior normal.          Physical Exam  Lungs were auscultated.  Heart was examined.  Abdominal exam was performed.    Vital Signs  Blood pressure is 116/83.    Result Review :Labs  Result Review  Imaging  Med Tab  Media  Procedures       Common labs          10/16/2024    12:04 11/21/2024    12:33   Common Labs   Glucose 107  84    BUN 9  12    Creatinine 0.82  0.64    Sodium 142  142    Potassium 3.5  3.6    Chloride 106  105    Calcium 9.8  9.6    Albumin 4.4  4.1    Total Bilirubin 0.3  0.2    Alkaline Phosphatase 91  90    AST (SGOT) 27  31    ALT (SGPT) 30  42    WBC 7.03  8.64    Hemoglobin 13.7  12.9    Hematocrit 41.2  40.5    Platelets 451  401        Results for orders placed or performed during the hospital encounter of 11/21/24   Comprehensive Metabolic Panel    Collection  Time: 11/21/24 12:33 PM    Specimen: Hand, Left; Blood   Result Value Ref Range    Glucose 84 65 - 99 mg/dL    BUN 12 6 - 20 mg/dL    Creatinine 0.64 0.57 - 1.00 mg/dL    Sodium 142 136 - 145 mmol/L    Potassium 3.6 3.5 - 5.2 mmol/L    Chloride 105 98 - 107 mmol/L    CO2 24.4 22.0 - 29.0 mmol/L    Calcium 9.6 8.6 - 10.5 mg/dL    Total Protein 7.8 6.0 - 8.5 g/dL    Albumin 4.1 3.5 - 5.2 g/dL    ALT (SGPT) 42 (H) 1 - 33 U/L    AST (SGOT) 31 1 - 32 U/L    Alkaline Phosphatase 90 39 - 117 U/L    Total Bilirubin 0.2 0.0 - 1.2 mg/dL    Globulin 3.7 gm/dL    A/G Ratio 1.1 g/dL    BUN/Creatinine Ratio 18.8 7.0 - 25.0    Anion Gap 12.6 5.0 - 15.0 mmol/L    eGFR 109.2 >60.0 mL/min/1.73   Ethanol    Collection Time: 11/21/24 12:33 PM    Specimen: Hand, Left; Blood   Result Value Ref Range    Ethanol <10 0 - 10 mg/dL    Ethanol % <0.010 %   TSH    Collection Time: 11/21/24 12:33 PM    Specimen: Hand, Left; Blood   Result Value Ref Range    TSH 0.040 (L) 0.270 - 4.200 uIU/mL   T4, Free    Collection Time: 11/21/24 12:33 PM    Specimen: Hand, Left; Blood   Result Value Ref Range    Free T4 2.57 (H) 0.92 - 1.68 ng/dL   CBC Auto Differential    Collection Time: 11/21/24 12:33 PM    Specimen: Hand, Left; Blood   Result Value Ref Range    WBC 8.64 3.40 - 10.80 10*3/mm3    RBC 4.32 3.77 - 5.28 10*6/mm3    Hemoglobin 12.9 12.0 - 15.9 g/dL    Hematocrit 40.5 34.0 - 46.6 %    MCV 93.8 79.0 - 97.0 fL    MCH 29.9 26.6 - 33.0 pg    MCHC 31.9 31.5 - 35.7 g/dL    RDW 12.3 12.3 - 15.4 %    RDW-SD 42.7 37.0 - 54.0 fl    MPV 8.8 6.0 - 12.0 fL    Platelets 401 140 - 450 10*3/mm3    Neutrophil % 69.5 42.7 - 76.0 %    Lymphocyte % 21.6 19.6 - 45.3 %    Monocyte % 7.6 5.0 - 12.0 %    Eosinophil % 0.9 0.3 - 6.2 %    Basophil % 0.2 0.0 - 1.5 %    Immature Grans % 0.2 0.0 - 0.5 %    Neutrophils, Absolute 5.99 1.70 - 7.00 10*3/mm3    Lymphocytes, Absolute 1.87 0.70 - 3.10 10*3/mm3    Monocytes, Absolute 0.66 0.10 - 0.90 10*3/mm3    Eosinophils, Absolute  0.08 0.00 - 0.40 10*3/mm3    Basophils, Absolute 0.02 0.00 - 0.20 10*3/mm3    Immature Grans, Absolute 0.02 0.00 - 0.05 10*3/mm3    nRBC 0.0 0.0 - 0.2 /100 WBC   Urine Culture - Urine, Urine, Clean Catch    Collection Time: 11/21/24  7:11 PM    Specimen: Urine, Clean Catch   Result Value Ref Range    Urine Culture 25,000 CFU/mL Mixed [place] Isolated    Urinalysis With Culture If Indicated - Urine, Clean Catch    Collection Time: 11/21/24  7:11 PM    Specimen: Urine, Clean Catch   Result Value Ref Range    Color, UA Yellow Yellow, Straw    Appearance, UA Clear Clear    pH, UA 6.0 5.0 - 8.0    Specific Gravity, UA 1.026 1.005 - 1.030    Glucose, UA Negative Negative    Ketones, UA Negative Negative    Bilirubin, UA Negative Negative    Blood, UA Negative Negative    Protein, UA Negative Negative    Leuk Esterase, UA Small (1+) (A) Negative    Nitrite, UA Negative Negative    Urobilinogen, UA 1.0 E.U./dL 0.2 - 1.0 E.U./dL   Urine Drug Screen - Urine, Clean Catch    Collection Time: 11/21/24  7:11 PM    Specimen: Urine, Clean Catch   Result Value Ref Range    THC, Screen, Urine Negative Negative    Phencyclidine (PCP), Urine Negative Negative    Cocaine Screen, Urine Negative Negative    Methamphetamine, Ur Negative Negative    Opiate Screen Negative Negative    Amphetamine Screen, Urine Negative Negative    Benzodiazepine Screen, Urine Negative Negative    Tricyclic Antidepressants Screen Negative Negative    Methadone Screen, Urine Negative Negative    Barbiturates Screen, Urine Negative Negative    Oxycodone Screen, Urine Negative Negative    Buprenorphine, Screen, Urine Negative Negative   Fentanyl, Urine - Urine, Clean Catch    Collection Time: 11/21/24  7:11 PM    Specimen: Urine, Clean Catch   Result Value Ref Range    Fentanyl, Urine Negative Negative   Urinalysis, Microscopic Only - Urine, Clean Catch    Collection Time: 11/21/24  7:11 PM    Specimen: Urine, Clean Catch   Result Value Ref Range    RBC, UA 0-2  None Seen, 0-2 /HPF    WBC, UA 6-10 (A) None Seen, 0-2 /HPF    Bacteria, UA None Seen None Seen /HPF    Squamous Epithelial Cells, UA 3-6 (A) None Seen, 0-2 /HPF    Hyaline Casts, UA 7-12 None Seen /LPF    Methodology Automated Microscopy        Results  Laboratory Studies  TSH was high 2 months ago and low 3 weeks ago.           Procedures        Assessment and Plan CC Problem List  Visit Diagnosis   ROS  Review (Popup)  [facility]  Quality  BestPractice  Medications  SmartSets  SnapShot Encounters  Media   Diagnoses and all orders for this visit:    1. Epigastric pain (Primary)  -     CBC w AUTO Differential; Future  -     Comprehensive metabolic panel; Future  -     Lipase; Future  -     omeprazole (priLOSEC) 20 MG capsule; Take 1-2 capsules by mouth Daily.  Dispense: 30 capsule; Refill: 1  -     ondansetron ODT (ZOFRAN-ODT) 4 MG disintegrating tablet; Place 1 tablet on the tongue Every 8 (Eight) Hours As Needed for Nausea or Vomiting.  Dispense: 30 tablet; Refill: 0  -     US Abdomen Limited; Future    2. Acquired hypothyroidism  -     TSH; Future  -     levothyroxine (SYNTHROID, LEVOTHROID) 137 MCG tablet; Take 1 tablet by mouth Every Morning. Indications: Underactive Thyroid  Dispense: 90 tablet; Refill: 0    3. Encounter for screening mammogram for malignant neoplasm of breast  -     Mammo Screening Digital Tomosynthesis Bilateral With CAD; Future    4. Screening for malignant neoplasm of colon  -     Ambulatory Referral to Gastroenterology    5. Esophageal dysphagia  -     Ambulatory Referral to Gastroenterology    6. Lipid screening  -     Lipid panel; Future    7. Essential hypertension  -     CBC w AUTO Differential; Future  -     Comprehensive metabolic panel; Future        Assessment & Plan  1. Abdominal pain.  The differential diagnosis includes gastritis, given her history of a small hernia and the nature of her symptoms. She reports constant pain that worsens and shoots to her  lower ribs, causing significant discomfort. She has not had any recent lab work. A comprehensive metabolic panel (CMP), complete blood count (CBC), lipase, and lipid panel will be ordered to assess liver and pancreas function. Omeprazole 20 mg twice daily will be initiated to manage potential gastritis. Zofran will be prescribed for nausea. An ultrasound will be ordered to rule out inflammation around the liver, although it may not be necessary if the medication and lab results are effective. A referral to gastroenterology will be made for further evaluation and management. If there is no improvement, imaging options and a follow-up with gastroenterology will be considered.    2. Dysphagia.  She has a history of dysphagia with six previous esophageal dilations, the last being in 2020. A referral to gastroenterology will be made for further evaluation and management.    3. Hypothyroidism.  Her TSH levels have been fluctuating, with a recent suppression noted three weeks ago. She is currently on levothyroxine 150 mcg. The levothyroxine dosage will be reduced to 137 mcg, and her TSH levels will be rechecked in two months. If she experiences symptoms suggestive of hyperthyroidism, the dosage will be adjusted accordingly. If she experiences hair loss with the adjusted dose, a referral to an endocrinologist will be considered.    4. Health maintenance.  A mammogram will be ordered as she is overdue for this screening. A screening colonoscopy will be scheduled due to her family history of colon cancer and current gastrointestinal symptoms.    PROCEDURE  The patient underwent cholecystectomy in 1995. She has also undergone six esophageal dilations, with the most recent one performed in 2020.    *Initially was going to hold off on imaging due to history of cholecystectomy, however due to the severity of pain I went ahead and ordered an ultrasound as a backup if labs and medication not indicative om direction of care  plan    Patient denies black or bloody stools but given the history of hiatal hernia, nausea, location of pain cannot rule out gastritis and/or GI bleed: Given that this has been ongoing for at least 2 weeks I do not have any indications to send the patient to the ER or order stat imaging at this time, ER precautions were discussed if pain was persistent and not improving (more than 2 hours as previously experienced ) or if having shortness of breath, dizziness, weakness.              Follow Up Instructions Charge Capture  Follow-up Communications   Return if symptoms worsen or fail to improve.  Patient was given instructions and counseling regarding her condition or for health maintenance advice. Please see specific information pulled into the AVS if appropriate.       Transcribed from ambient dictation for Latrell Mathew DO by Latrell Mathew DO.  12/18/24   08:00 EST    Patient or patient representative verbalized consent for the use of Ambient Listening during the visit with  Latrell Mathew DO for chart documentation. 12/18/2024  08:51 EST

## 2024-12-19 ENCOUNTER — TELEPHONE (OUTPATIENT)
Dept: FAMILY MEDICINE CLINIC | Facility: CLINIC | Age: 49
End: 2024-12-19
Payer: MEDICAID

## 2024-12-19 ENCOUNTER — HOSPITAL ENCOUNTER (EMERGENCY)
Facility: HOSPITAL | Age: 49
Discharge: HOME | End: 2024-12-19
Attending: EMERGENCY MEDICINE
Payer: MEDICAID

## 2024-12-19 VITALS
HEART RATE: 71 BPM | OXYGEN SATURATION: 96 % | RESPIRATION RATE: 18 BRPM | DIASTOLIC BLOOD PRESSURE: 79 MMHG | WEIGHT: 213.85 LBS | BODY MASS INDEX: 35.63 KG/M2 | SYSTOLIC BLOOD PRESSURE: 120 MMHG | HEIGHT: 65 IN | TEMPERATURE: 98.7 F

## 2024-12-19 DIAGNOSIS — K21.00 GASTROESOPHAGEAL REFLUX DISEASE WITH ESOPHAGITIS WITHOUT HEMORRHAGE: Primary | ICD-10-CM

## 2024-12-19 LAB
ALBUMIN SERPL-MCNC: 4.1 G/DL (ref 3.5–5.2)
ALBUMIN/GLOB SERPL: 1.1 G/DL
ALP SERPL-CCNC: 84 U/L (ref 39–117)
ALT SERPL W P-5'-P-CCNC: 17 U/L (ref 1–33)
ANION GAP SERPL CALCULATED.3IONS-SCNC: 12.8 MMOL/L (ref 5–15)
AST SERPL-CCNC: 20 U/L (ref 1–32)
BACTERIA UR QL AUTO: ABNORMAL /HPF
BASOPHILS # BLD AUTO: 0.03 10*3/MM3 (ref 0–0.2)
BASOPHILS NFR BLD AUTO: 0.4 % (ref 0–1.5)
BILIRUB SERPL-MCNC: 0.4 MG/DL (ref 0–1.2)
BILIRUB UR QL STRIP: NEGATIVE
BUN SERPL-MCNC: 10 MG/DL (ref 6–20)
BUN/CREAT SERPL: 14.5 (ref 7–25)
CALCIUM SPEC-SCNC: 9.3 MG/DL (ref 8.6–10.5)
CHLORIDE SERPL-SCNC: 101 MMOL/L (ref 98–107)
CLARITY UR: ABNORMAL
CO2 SERPL-SCNC: 23.2 MMOL/L (ref 22–29)
COLOR UR: YELLOW
CREAT SERPL-MCNC: 0.69 MG/DL (ref 0.57–1)
DEPRECATED RDW RBC AUTO: 43.6 FL (ref 37–54)
EGFRCR SERPLBLD CKD-EPI 2021: 107.2 ML/MIN/1.73
EOSINOPHIL # BLD AUTO: 0.12 10*3/MM3 (ref 0–0.4)
EOSINOPHIL NFR BLD AUTO: 1.5 % (ref 0.3–6.2)
ERYTHROCYTE [DISTWIDTH] IN BLOOD BY AUTOMATED COUNT: 13.2 % (ref 12.3–15.4)
GLOBULIN UR ELPH-MCNC: 3.9 GM/DL
GLUCOSE SERPL-MCNC: 114 MG/DL (ref 65–99)
GLUCOSE UR STRIP-MCNC: NEGATIVE MG/DL
HCT VFR BLD AUTO: 39.2 % (ref 34–46.6)
HGB BLD-MCNC: 12.7 G/DL (ref 12–15.9)
HGB UR QL STRIP.AUTO: NEGATIVE
HOLD SPECIMEN: NORMAL
HYALINE CASTS UR QL AUTO: ABNORMAL /LPF
IMM GRANULOCYTES # BLD AUTO: 0.02 10*3/MM3 (ref 0–0.05)
IMM GRANULOCYTES NFR BLD AUTO: 0.2 % (ref 0–0.5)
KETONES UR QL STRIP: ABNORMAL
LEUKOCYTE ESTERASE UR QL STRIP.AUTO: ABNORMAL
LIPASE SERPL-CCNC: 90 U/L (ref 13–60)
LYMPHOCYTES # BLD AUTO: 1.62 10*3/MM3 (ref 0.7–3.1)
LYMPHOCYTES NFR BLD AUTO: 20 % (ref 19.6–45.3)
MCH RBC QN AUTO: 29.6 PG (ref 26.6–33)
MCHC RBC AUTO-ENTMCNC: 32.4 G/DL (ref 31.5–35.7)
MCV RBC AUTO: 91.4 FL (ref 79–97)
MONOCYTES # BLD AUTO: 0.46 10*3/MM3 (ref 0.1–0.9)
MONOCYTES NFR BLD AUTO: 5.7 % (ref 5–12)
NEUTROPHILS NFR BLD AUTO: 5.85 10*3/MM3 (ref 1.7–7)
NEUTROPHILS NFR BLD AUTO: 72.2 % (ref 42.7–76)
NITRITE UR QL STRIP: NEGATIVE
NRBC BLD AUTO-RTO: 0 /100 WBC (ref 0–0.2)
PH UR STRIP.AUTO: 6.5 [PH] (ref 5–8)
PLATELET # BLD AUTO: 378 10*3/MM3 (ref 140–450)
PMV BLD AUTO: 9.1 FL (ref 6–12)
POTASSIUM SERPL-SCNC: 3.7 MMOL/L (ref 3.5–5.2)
PROT SERPL-MCNC: 8 G/DL (ref 6–8.5)
PROT UR QL STRIP: NEGATIVE
RBC # BLD AUTO: 4.29 10*6/MM3 (ref 3.77–5.28)
RBC # UR STRIP: ABNORMAL /HPF
REF LAB TEST METHOD: ABNORMAL
SODIUM SERPL-SCNC: 137 MMOL/L (ref 136–145)
SP GR UR STRIP: 1.02 (ref 1–1.03)
SQUAMOUS #/AREA URNS HPF: ABNORMAL /HPF
UROBILINOGEN UR QL STRIP: ABNORMAL
WBC # UR STRIP: ABNORMAL /HPF
WBC NRBC COR # BLD AUTO: 8.1 10*3/MM3 (ref 3.4–10.8)
WHOLE BLOOD HOLD COAG: NORMAL
WHOLE BLOOD HOLD SPECIMEN: NORMAL

## 2024-12-19 PROCEDURE — 36415 COLL VENOUS BLD VENIPUNCTURE: CPT

## 2024-12-19 PROCEDURE — 80053 COMPREHEN METABOLIC PANEL: CPT | Performed by: EMERGENCY MEDICINE

## 2024-12-19 PROCEDURE — 83690 ASSAY OF LIPASE: CPT | Performed by: EMERGENCY MEDICINE

## 2024-12-19 PROCEDURE — 85025 COMPLETE CBC W/AUTO DIFF WBC: CPT | Performed by: EMERGENCY MEDICINE

## 2024-12-19 PROCEDURE — 87086 URINE CULTURE/COLONY COUNT: CPT | Performed by: EMERGENCY MEDICINE

## 2024-12-19 PROCEDURE — 81001 URINALYSIS AUTO W/SCOPE: CPT | Performed by: EMERGENCY MEDICINE

## 2024-12-19 PROCEDURE — 99283 EMERGENCY DEPT VISIT LOW MDM: CPT

## 2024-12-19 RX ORDER — SODIUM CHLORIDE 0.9 % (FLUSH) 0.9 %
10 SYRINGE (ML) INJECTION AS NEEDED
Status: DISCONTINUED | OUTPATIENT
Start: 2024-12-19 | End: 2024-12-19 | Stop reason: HOSPADM

## 2024-12-19 RX ORDER — ALUMINA, MAGNESIA, AND SIMETHICONE 2400; 2400; 240 MG/30ML; MG/30ML; MG/30ML
15 SUSPENSION ORAL ONCE
Status: COMPLETED | OUTPATIENT
Start: 2024-12-19 | End: 2024-12-19

## 2024-12-19 RX ORDER — PANTOPRAZOLE SODIUM 40 MG/1
40 TABLET, DELAYED RELEASE ORAL DAILY
Qty: 14 TABLET | Refills: 0 | Status: SHIPPED | OUTPATIENT
Start: 2024-12-19

## 2024-12-19 RX ORDER — CALCIUM CARBONATE 500 MG/1
1 TABLET, CHEWABLE ORAL ONCE
Status: COMPLETED | OUTPATIENT
Start: 2024-12-19 | End: 2024-12-19

## 2024-12-19 RX ORDER — SUCRALFATE 1 G/1
1 TABLET ORAL
Qty: 28 TABLET | Refills: 0 | Status: SHIPPED | OUTPATIENT
Start: 2024-12-19

## 2024-12-19 RX ADMIN — CALCIUM CARBONATE 1 TABLET: 500 TABLET, CHEWABLE ORAL at 10:43

## 2024-12-19 RX ADMIN — ALUMINUM HYDROXIDE, MAGNESIUM HYDROXIDE, AND DIMETHICONE 15 ML: 400; 400; 40 SUSPENSION ORAL at 10:43

## 2024-12-19 NOTE — DISCHARGE INSTRUCTIONS
Return to emergency department immediately for uncontrolled pain, vomiting blood, black tar-like stools.  Stay well-hydrated and eat a bland diet.  I did prescribe Protonix since insurance did not cover your omeprazole.  Do not take both of these medications together.  Follow-up with your family doctor today or tomorrow.  I did order an outpatient CT scan of your abdomen and pelvis, however if your symptoms resolved with GI medications and bland diet you might consider avoiding this due to radiation/contrast exposure.  Please discuss this decision making with your primary care provider.

## 2024-12-19 NOTE — ED TRIAGE NOTES
Pt arrives to ED with complaints of upper abdominal pain and right rib pain that started a couple weeks ago .. Per pt she went to her PCP yesterday and had blood work taken and was tested for H pylori, pancreatitis, and gastritis.

## 2024-12-19 NOTE — TELEPHONE ENCOUNTER
Caller: Kerry Foster    Relationship: Self    Best call back number:     [phone]       Which medication are you concerned about: omeprazole (priLOSEC) 20 MG capsule     Who prescribed you this medication: MOISE FIELDS    When did you start taking this medication: 12/18/24    What are your concerns: INSURANCE DENIED MEDICATION. PATIENT STATES STOMACH PAIN IS MUCH WORSE THIS MORNING    PLEASE CALL AND ADVISE    How long have you had these concerns:

## 2024-12-19 NOTE — ED PROVIDER NOTES
Time: 10:16 AM EST  Date of encounter:  12/19/2024  Independent Historian/Clinical History and Information was obtained by:   Patient    History is limited by: N/A    Chief Complaint: Abdominal pain      History of Present Illness:  Patient is a 49 y.o. year old female who presents to the emergency department for evaluation of approximately 2 weeks worth of right upper abdominal/flank pain.  Saw her primary care physician yesterday and received outpatient lab test.      Patient Care Team  Primary Care Provider: Latrell Mathew DO    Past Medical History:     Allergies   Allergen Reactions    Codeine Hives and Rash     HIVES    Morphine Anaphylaxis and Shortness Of Breath    Oxycodone Other (See Comments)     hallucinations     Past Medical History:   Diagnosis Date    Arthritis     Breast pain     HTN (hypertension)     Overdose     Vaginal yeast infection      Past Surgical History:   Procedure Laterality Date    ANTERIOR CERVICAL FUSION      BREAST BIOPSY      GALLBLADDER SURGERY      HYSTERECTOMY      LASER ABLATION      TUBAL ABDOMINAL LIGATION       Family History   Problem Relation Age of Onset    Lung cancer Mother     Cancer Brother         ORAL/MOUTH    Other (Posttraumatic stress disorder) Brother     Drug abuse Brother     Cancer Brother         BONE    Breast cancer Maternal Grandmother         70S    Pancreatic cancer Maternal Grandfather         UNKNOWN AGE     Breast cancer Paternal Grandmother         UNKNOWN AGE     Breast cancer Maternal Aunt         UNKNOWN AGE     Lung cancer Maternal Aunt     Canavan disease Maternal Aunt     Cancer Maternal Aunt         BLADDER    Cancer Paternal Uncle         UNKNOWN ORGIN     Breast cancer Cousin         30S    Breast cancer Cousin         30S       Home Medications:  Prior to Admission medications    Medication Sig Start Date End Date Taking? Authorizing Provider   levothyroxine (SYNTHROID, LEVOTHROID) 137 MCG tablet Take 1 tablet by mouth Every Morning.  "Indications: Underactive Thyroid 12/18/24   Latrell Mathew DO   lisinopril (PRINIVIL,ZESTRIL) 10 MG tablet Take 1 tablet by mouth Daily. Indications: High Blood Pressure 10/10/24   Provider, MD Lilia   omeprazole (priLOSEC) 20 MG capsule Take 1-2 capsules by mouth Daily. 12/18/24   Latrell Mathew DO   ondansetron ODT (ZOFRAN-ODT) 4 MG disintegrating tablet Place 1 tablet on the tongue Every 8 (Eight) Hours As Needed for Nausea or Vomiting. 12/18/24   Latrell Mathew DO   venlafaxine XR (EFFEXOR-XR) 150 MG 24 hr capsule Take 1 capsule by mouth Daily With Breakfast. Indications: Major Depressive Disorder 11/25/24   Swati Scales MD        Social History:   Social History     Tobacco Use    Smoking status: Never     Passive exposure: Never    Smokeless tobacco: Never   Vaping Use    Vaping status: Never Used   Substance Use Topics    Alcohol use: Yes     Comment: OCC    Drug use: No         Review of Systems:  Review of Systems   Constitutional:  Negative for chills and fever.   HENT:  Negative for congestion, rhinorrhea and sore throat.    Eyes:  Negative for photophobia.   Respiratory:  Negative for apnea, cough, chest tightness and shortness of breath.    Cardiovascular:  Negative for chest pain and palpitations.   Gastrointestinal:  Positive for abdominal pain and nausea. Negative for diarrhea and vomiting.   Endocrine: Negative.    Genitourinary:  Negative for difficulty urinating and dysuria.   Musculoskeletal:  Negative for back pain, joint swelling and myalgias.   Skin:  Negative for color change and wound.   Allergic/Immunologic: Negative.    Neurological:  Negative for seizures and headaches.   Psychiatric/Behavioral: Negative.     All other systems reviewed and are negative.       Physical Exam:  /79   Pulse 71   Temp 98.7 °F (37.1 °C) (Oral)   Resp 18   Ht 165.1 cm (65\")   Wt 97 kg (213 lb 13.5 oz)   LMP  (LMP Unknown)   SpO2 96%   BMI 35.59 kg/m²     Physical Exam  Vitals and " nursing note reviewed.   Constitutional:       General: She is awake.      Appearance: Normal appearance.   HENT:      Head: Normocephalic and atraumatic.      Nose: Nose normal.      Mouth/Throat:      Mouth: Mucous membranes are moist.   Eyes:      Extraocular Movements: Extraocular movements intact.      Pupils: Pupils are equal, round, and reactive to light.   Cardiovascular:      Rate and Rhythm: Normal rate and regular rhythm.      Heart sounds: Normal heart sounds.   Pulmonary:      Effort: Pulmonary effort is normal. No respiratory distress.      Breath sounds: Normal breath sounds. No wheezing, rhonchi or rales.   Abdominal:      General: Bowel sounds are normal.      Palpations: Abdomen is soft.      Tenderness: There is no abdominal tenderness. There is no guarding or rebound.      Comments: No rigidity   Musculoskeletal:         General: No tenderness. Normal range of motion.      Cervical back: Normal range of motion and neck supple.   Skin:     General: Skin is warm and dry.      Coloration: Skin is not jaundiced.   Neurological:      General: No focal deficit present.      Mental Status: Mental status is at baseline.   Psychiatric:         Mood and Affect: Mood normal.                    Medical Decision Making:      Comorbidities that affect care:    Hypertension, depression, GERD, thyroid disease    External Notes reviewed:    Previous Clinic Note: Family medicine office visit yesterday.  Description: Epigastric pain, acquired hypothyroidism.  Assessment and plan as follows:  Assessment & Plan  1. Abdominal pain.  The differential diagnosis includes gastritis, given her history of a small hernia and the nature of her symptoms. She reports constant pain that worsens and shoots to her lower ribs, causing significant discomfort. She has not had any recent lab work. A comprehensive metabolic panel (CMP), complete blood count (CBC), lipase, and lipid panel will be ordered to assess liver and pancreas  function. Omeprazole 20 mg twice daily will be initiated to manage potential gastritis. Zofran will be prescribed for nausea. An ultrasound will be ordered to rule out inflammation around the liver, although it may not be necessary if the medication and lab results are effective. A referral to gastroenterology will be made for further evaluation and management. If there is no improvement, imaging options and a follow-up with gastroenterology will be considered.     2. Dysphagia.  She has a history of dysphagia with six previous esophageal dilations, the last being in 2020. A referral to gastroenterology will be made for further evaluation and management.          The following orders were placed and all results were independently analyzed by me:  Orders Placed This Encounter   Procedures    Urine Culture - Urine,    CT abdomen pelvis w contrast    [place] Draw    Comprehensive Metabolic Panel    Lipase    Urinalysis With Microscopic If Indicated (No Culture) - Urine, Clean Catch    CBC Auto Differential    Urinalysis, Microscopic Only - Urine, Clean Catch    NPO Diet NPO Type: Strict NPO    Undress & Gown    Insert Peripheral IV    CBC & Differential    Green Top (Gel)    Lavender Top    Gold Top - SST    Light Blue Top    Extra Tubes    Gray Top       Medications Given in the Emergency Department:  Medications   sodium chloride 0.9 % flush 10 mL (has no administration in time range)   aluminum-magnesium hydroxide-simethicone (MAALOX MAX) 400-400-40 MG/5ML suspension 15 mL (15 mL Oral Given 12/19/24 1043)   calcium carbonate (TUMS) chewable tablet 500 mg (200 mg elemental) (1 tablet Oral Given 12/19/24 1043)        ED Course:         Labs:    Lab Results (last 24 hours)       Procedure Component Value Units Date/Time    CBC & Differential [[id number]]  (Normal) Collected: 12/19/24 0950    Specimen: Blood Updated: 12/19/24 1000    Narrative:      The following orders were created for panel order CBC &  Differential.  Procedure                               Abnormality         Status                     ---------                               -----------         ------                     CBC Auto Differential[[id number]]        Normal              Final result                 Please view results for these tests on the individual orders.    Comprehensive Metabolic Panel [[id number]]  (Abnormal) Collected: 12/19/24 0950    Specimen: Blood Updated: 12/19/24 1023     Glucose 114 mg/dL      BUN 10 mg/dL      Creatinine 0.69 mg/dL      Sodium 137 mmol/L      Potassium 3.7 mmol/L      Chloride 101 mmol/L      CO2 23.2 mmol/L      Calcium 9.3 mg/dL      Total Protein 8.0 g/dL      Albumin 4.1 g/dL      ALT (SGPT) 17 U/L      AST (SGOT) 20 U/L      Alkaline Phosphatase 84 U/L      Total Bilirubin 0.4 mg/dL      Globulin 3.9 gm/dL      A/G Ratio 1.1 g/dL      BUN/Creatinine Ratio 14.5     Anion Gap 12.8 mmol/L      eGFR 107.2 mL/min/1.73     Narrative:      GFR Categories in Chronic Kidney Disease (CKD)      GFR Category          GFR (mL/min/1.73)    Interpretation  G1                     90 or greater         Normal or high (1)  G2                      60-89                Mild decrease (1)  G3a                   45-59                Mild to moderate decrease  G3b                   30-44                Moderate to severe decrease  G4                    15-29                Severe decrease  G5                    14 or less           Kidney failure          (1)In the absence of evidence of kidney disease, neither GFR category G1 or G2 fulfill the criteria for CKD.    eGFR calculation 2021 CKD-EPI creatinine equation, which does not include race as a factor    Lipase [[id number]]  (Abnormal) Collected: 12/19/24 0950    Specimen: Blood Updated: 12/19/24 1023     Lipase 90 U/L     CBC Auto Differential [[id number]]  (Normal) Collected: 12/19/24 0950    Specimen: Blood Updated: 12/19/24 1000     WBC 8.10 10*3/mm3      RBC 4.29  10*6/mm3      Hemoglobin 12.7 g/dL      Hematocrit 39.2 %      MCV 91.4 fL      MCH 29.6 pg      MCHC 32.4 g/dL      RDW 13.2 %      RDW-SD 43.6 fl      MPV 9.1 fL      Platelets 378 10*3/mm3      Neutrophil % 72.2 %      Lymphocyte % 20.0 %      Monocyte % 5.7 %      Eosinophil % 1.5 %      Basophil % 0.4 %      Immature Grans % 0.2 %      Neutrophils, Absolute 5.85 10*3/mm3      Lymphocytes, Absolute 1.62 10*3/mm3      Monocytes, Absolute 0.46 10*3/mm3      Eosinophils, Absolute 0.12 10*3/mm3      Basophils, Absolute 0.03 10*3/mm3      Immature Grans, Absolute 0.02 10*3/mm3      nRBC 0.0 /100 WBC     Urinalysis With Microscopic If Indicated (No Culture) - Urine, Clean Catch [[id number]]  (Abnormal) Collected: 12/19/24 1113    Specimen: Urine, Clean Catch Updated: 12/19/24 1122     Color, UA Yellow     Appearance, UA Cloudy     pH, UA 6.5     Specific Gravity, UA 1.025     Glucose, UA Negative     Ketones, UA Trace     Bilirubin, UA Negative     Blood, UA Negative     Protein, UA Negative     Leuk Esterase, UA Trace     Nitrite, UA Negative     Urobilinogen, UA 1.0 E.U./dL    Urinalysis, Microscopic Only - Urine, Clean Catch [[id number]]  (Abnormal) Collected: 12/19/24 1113    Specimen: Urine, Clean Catch Updated: 12/19/24 1124     RBC, UA 0-2 /HPF      WBC, UA 11-20 /HPF      Bacteria, UA 2+ /HPF      Squamous Epithelial Cells, UA 21-30 /HPF      Hyaline Casts, UA 0-2 /LPF      Methodology Automated Microscopy    Urine Culture - Urine, Urine, Clean Catch [[id number]] Collected: 12/19/24 1113    Specimen: Urine, Clean Catch Updated: 12/19/24 1147             Imaging:    No Radiology Exams Resulted Within Past 24 Hours      Differential Diagnosis and Discussion:    Abdominal Pain: Based on the patient's signs and symptoms, I considered abdominal aortic aneurysm, small bowel obstruction, pancreatitis, acute cholecystitis, acute appendecitis, peptic ulcer disease, gastritis, colitis, endocrine disorders, irritable bowel  syndrome and other differential diagnosis an etiology of the patient's abdominal pain.    PROCEDURES:    Labs were drawn in the emergency department and all labs were reviewed and interpreted by me.    No orders to display       Procedures    MDM                     Patient Care Considerations:    ANTIBIOTICS: I considered prescribing antibiotics as an outpatient however no bacterial focus of infection was found.  The patient has no dysuria or pelvic pain.  She is aware that we send the urine culture and we will follow-up.    Consultants/Shared Management Plan:    None    Social Determinants of Health:    Patient is independent, reliable, and has access to care.       Disposition and Care Coordination:    Discharged: The patient is suitable and stable for discharge with no need for consideration of admission.    I have explained the patient´s condition, diagnoses and treatment plan based on the information available to me at this time. I have answered questions and addressed any concerns. The patient has a good  understanding of the patient´s diagnosis, condition, and treatment plan as can be expected at this point. The vital signs have been stable. The patient´s condition is stable and appropriate for discharge from the emergency department.      The patient will pursue further outpatient evaluation with the primary care physician or other designated or consulting physician as outlined in the discharge instructions. They are agreeable to this plan of care and follow-up instructions have been explained in detail. The patient has received these instructions in written format and has expressed an understanding of the discharge instructions. The patient is aware that any significant change in condition or worsening of symptoms should prompt an immediate return to this or the closest emergency department or call to 911.    Final diagnoses:   Gastroesophageal reflux disease with esophagitis without hemorrhage        ED  Disposition       ED Disposition   Discharge    Condition   Stable    Comment   --               This medical record created using voice recognition software.             Memo Peña MD  12/19/24 [id number]

## 2024-12-20 LAB — BACTERIA SPEC AEROBE CULT: NO GROWTH

## 2024-12-30 ENCOUNTER — LAB (OUTPATIENT)
Dept: LAB | Facility: HOSPITAL | Age: 49
End: 2024-12-30
Payer: MEDICAID

## 2024-12-30 ENCOUNTER — TELEPHONE (OUTPATIENT)
Dept: GASTROENTEROLOGY | Facility: CLINIC | Age: 49
End: 2024-12-30
Payer: MEDICAID

## 2024-12-30 ENCOUNTER — OFFICE VISIT (OUTPATIENT)
Dept: FAMILY MEDICINE CLINIC | Facility: CLINIC | Age: 49
End: 2024-12-30
Payer: MEDICAID

## 2024-12-30 ENCOUNTER — HOSPITAL ENCOUNTER (OUTPATIENT)
Dept: CT IMAGING | Facility: HOSPITAL | Age: 49
Discharge: HOME | End: 2024-12-30
Payer: MEDICAID

## 2024-12-30 VITALS
BODY MASS INDEX: 35.49 KG/M2 | OXYGEN SATURATION: 97 % | WEIGHT: 213 LBS | SYSTOLIC BLOOD PRESSURE: 123 MMHG | DIASTOLIC BLOOD PRESSURE: 75 MMHG | HEIGHT: 65 IN | HEART RATE: 99 BPM

## 2024-12-30 DIAGNOSIS — R74.8 ELEVATED LIPASE: ICD-10-CM

## 2024-12-30 DIAGNOSIS — R10.13 EPIGASTRIC PAIN: Primary | ICD-10-CM

## 2024-12-30 DIAGNOSIS — R10.13 EPIGASTRIC PAIN: ICD-10-CM

## 2024-12-30 LAB
ALBUMIN SERPL-MCNC: 3.9 G/DL (ref 3.5–5.2)
ALBUMIN/GLOB SERPL: 1.1 G/DL
ALP SERPL-CCNC: 85 U/L (ref 39–117)
ALT SERPL W P-5'-P-CCNC: 19 U/L (ref 1–33)
ANION GAP SERPL CALCULATED.3IONS-SCNC: 9.6 MMOL/L (ref 5–15)
AST SERPL-CCNC: 19 U/L (ref 1–32)
BASOPHILS # BLD AUTO: 0.04 10*3/MM3 (ref 0–0.2)
BASOPHILS NFR BLD AUTO: 0.5 % (ref 0–1.5)
BILIRUB SERPL-MCNC: 0.2 MG/DL (ref 0–1.2)
BUN SERPL-MCNC: 8 MG/DL (ref 6–20)
BUN/CREAT SERPL: 11.4 (ref 7–25)
CALCIUM SPEC-SCNC: 9.3 MG/DL (ref 8.6–10.5)
CHLORIDE SERPL-SCNC: 98 MMOL/L (ref 98–107)
CO2 SERPL-SCNC: 25.4 MMOL/L (ref 22–29)
CREAT SERPL-MCNC: 0.7 MG/DL (ref 0.57–1)
DEPRECATED RDW RBC AUTO: 43.9 FL (ref 37–54)
EGFRCR SERPLBLD CKD-EPI 2021: 106.8 ML/MIN/1.73
EOSINOPHIL # BLD AUTO: 0.08 10*3/MM3 (ref 0–0.4)
EOSINOPHIL NFR BLD AUTO: 1 % (ref 0.3–6.2)
ERYTHROCYTE [DISTWIDTH] IN BLOOD BY AUTOMATED COUNT: 13 % (ref 12.3–15.4)
GLOBULIN UR ELPH-MCNC: 3.5 GM/DL
GLUCOSE SERPL-MCNC: 105 MG/DL (ref 65–99)
HCT VFR BLD AUTO: 39 % (ref 34–46.6)
HGB BLD-MCNC: 13.1 G/DL (ref 12–15.9)
IMM GRANULOCYTES # BLD AUTO: 0.02 10*3/MM3 (ref 0–0.05)
IMM GRANULOCYTES NFR BLD AUTO: 0.2 % (ref 0–0.5)
LIPASE SERPL-CCNC: 75 U/L (ref 13–60)
LYMPHOCYTES # BLD AUTO: 1.71 10*3/MM3 (ref 0.7–3.1)
LYMPHOCYTES NFR BLD AUTO: 20.7 % (ref 19.6–45.3)
MCH RBC QN AUTO: 31.2 PG (ref 26.6–33)
MCHC RBC AUTO-ENTMCNC: 33.6 G/DL (ref 31.5–35.7)
MCV RBC AUTO: 92.9 FL (ref 79–97)
MONOCYTES # BLD AUTO: 0.53 10*3/MM3 (ref 0.1–0.9)
MONOCYTES NFR BLD AUTO: 6.4 % (ref 5–12)
NEUTROPHILS NFR BLD AUTO: 5.9 10*3/MM3 (ref 1.7–7)
NEUTROPHILS NFR BLD AUTO: 71.2 % (ref 42.7–76)
NRBC BLD AUTO-RTO: 0 /100 WBC (ref 0–0.2)
PLATELET # BLD AUTO: 418 10*3/MM3 (ref 140–450)
PMV BLD AUTO: 9.6 FL (ref 6–12)
POTASSIUM SERPL-SCNC: 3.9 MMOL/L (ref 3.5–5.2)
PROT SERPL-MCNC: 7.4 G/DL (ref 6–8.5)
RBC # BLD AUTO: 4.2 10*6/MM3 (ref 3.77–5.28)
SODIUM SERPL-SCNC: 133 MMOL/L (ref 136–145)
WBC NRBC COR # BLD AUTO: 8.28 10*3/MM3 (ref 3.4–10.8)

## 2024-12-30 PROCEDURE — 85025 COMPLETE CBC W/AUTO DIFF WBC: CPT

## 2024-12-30 PROCEDURE — 25510000001 IOPAMIDOL PER 1 ML: Performed by: FAMILY MEDICINE

## 2024-12-30 PROCEDURE — 74160 CT ABDOMEN W/CONTRAST: CPT

## 2024-12-30 PROCEDURE — 99214 OFFICE O/P EST MOD 30 MIN: CPT | Performed by: FAMILY MEDICINE

## 2024-12-30 PROCEDURE — 36415 COLL VENOUS BLD VENIPUNCTURE: CPT

## 2024-12-30 PROCEDURE — 83690 ASSAY OF LIPASE: CPT

## 2024-12-30 PROCEDURE — 80053 COMPREHEN METABOLIC PANEL: CPT

## 2024-12-30 PROCEDURE — 3074F SYST BP LT 130 MM HG: CPT | Performed by: FAMILY MEDICINE

## 2024-12-30 PROCEDURE — 3078F DIAST BP <80 MM HG: CPT | Performed by: FAMILY MEDICINE

## 2024-12-30 RX ORDER — IOPAMIDOL 755 MG/ML
100 INJECTION, SOLUTION INTRAVASCULAR
Status: COMPLETED | OUTPATIENT
Start: 2024-12-30 | End: 2024-12-30

## 2024-12-30 RX ADMIN — IOPAMIDOL 100 ML: 755 INJECTION, SOLUTION INTRAVENOUS at 13:07

## 2024-12-30 NOTE — TELEPHONE ENCOUNTER
Patient returned phone call to the office, we had contacted due to getting an urgent referral was able to move patient appointment from April 2025 until 12/31/2024 at 8:30 am. MA has been made aware of this change to scheduled.

## 2024-12-30 NOTE — PROGRESS NOTES
"Chief Complaint  Abdominal Pain    Subjective     Problem List  Visit Diagnosis   Encounters  Notes  Medications  Labs  Result Review Imaging  Media    Kerry Foster presents to [facility]  Abdominal Pain        History of Present Illness    Patient was previously seen on 12/18/2024 for abdominal pain that had been ongoing for a few weeks at that time she noted that the pain was generally a 6 out of 10 but occasionally would be sharp and severe causing her to double over.  The pain is mainly in the epigastric area but when the sharp shooting pain occurs it shoots across to the right upper quadrant.  Patient was seen in the ED on 12/19/2024, no imaging was done.  Labs on 12/18/2024 showed an elevated lipase was 115 and then decreased the following day to 90.  Patient is following up today because she cannot get a routine CT until January 13 and her pain continues to worsen each day,  notes that she is doubling over and crying in pain.  She is dry heaving but states she is maintaining hydration.      History of cholecystectomy.     Objective   Vital Signs:   /75 (BP Location: Right arm, Patient Position: Sitting, Cuff Size: Large Adult)   Pulse 99   Ht 165.1 cm (65\")   Wt 96.6 kg (213 lb)   SpO2 97%   BMI 35.45 kg/m²     Physical Exam  Vitals reviewed.   Constitutional:       General: She is not in acute distress.     Appearance: Normal appearance.   HENT:      Head: Normocephalic and atraumatic.      Mouth/Throat:      Mouth: Mucous membranes are moist.   Eyes:      Conjunctiva/sclera: Conjunctivae normal.   Pulmonary:      Effort: Pulmonary effort is normal.   Abdominal:      Palpations: Abdomen is soft.      Tenderness: There is abdominal tenderness in the right upper quadrant and epigastric area. There is guarding. There is no right CVA tenderness, left CVA tenderness or rebound. Negative signs include Ibarra's sign.   Musculoskeletal:         " General: No swelling.      Cervical back: Normal range of motion and neck supple.   Skin:     General: Skin is warm.   Neurological:      General: No focal deficit present.      Mental Status: She is alert.   Psychiatric:         Mood and Affect: Mood normal.         Behavior: Behavior normal.          Physical Exam      Result Review :Labs  Result Review  Imaging  Med Tab  Media  Procedures       Common labs          11/21/2024    12:33 12/18/2024    09:27 12/19/2024    09:50   Common Labs   Glucose 84  106  114    BUN 12  9  10    Creatinine 0.64  0.76  0.69    Sodium 142  141  137    Potassium 3.6  4.8  3.7    Chloride 105  106  101    Calcium 9.6  10.0  9.3    Albumin 4.1  4.2  4.1    Total Bilirubin 0.2  0.2  0.4    Alkaline Phosphatase 90  87  84    AST (SGOT) 31  22  20    ALT (SGPT) 42  19  17    WBC 8.64  8.27  8.10    Hemoglobin 12.9  13.2  12.7    Hematocrit 40.5  39.6  39.2    Platelets 401  426  378    Total Cholesterol  216     Triglycerides  201     HDL Cholesterol  46     LDL Cholesterol   134         Results for orders placed or performed during the hospital encounter of 12/19/24   Comprehensive Metabolic Panel    Collection Time: 12/19/24  9:50 AM    Specimen: Blood   Result Value Ref Range    Glucose 114 (H) 65 - 99 mg/dL    BUN 10 6 - 20 mg/dL    Creatinine 0.69 0.57 - 1.00 mg/dL    Sodium 137 136 - 145 mmol/L    Potassium 3.7 3.5 - 5.2 mmol/L    Chloride 101 98 - 107 mmol/L    CO2 23.2 22.0 - 29.0 mmol/L    Calcium 9.3 8.6 - 10.5 mg/dL    Total Protein 8.0 6.0 - 8.5 g/dL    Albumin 4.1 3.5 - 5.2 g/dL    ALT (SGPT) 17 1 - 33 U/L    AST (SGOT) 20 1 - 32 U/L    Alkaline Phosphatase 84 39 - 117 U/L    Total Bilirubin 0.4 0.0 - 1.2 mg/dL    Globulin 3.9 gm/dL    A/G Ratio 1.1 g/dL    BUN/Creatinine Ratio 14.5 7.0 - 25.0    Anion Gap 12.8 5.0 - 15.0 mmol/L    eGFR 107.2 >60.0 mL/min/1.73   Lipase    Collection Time: 12/19/24  9:50 AM    Specimen: Blood   Result Value Ref Range    Lipase 90 (H) 13  - 60 U/L   CBC Auto Differential    Collection Time: 12/19/24  9:50 AM    Specimen: Blood   Result Value Ref Range    WBC 8.10 3.40 - 10.80 10*3/mm3    RBC 4.29 3.77 - 5.28 10*6/mm3    Hemoglobin 12.7 12.0 - 15.9 g/dL    Hematocrit 39.2 34.0 - 46.6 %    MCV 91.4 79.0 - 97.0 fL    MCH 29.6 26.6 - 33.0 pg    MCHC 32.4 31.5 - 35.7 g/dL    RDW 13.2 12.3 - 15.4 %    RDW-SD 43.6 37.0 - 54.0 fl    MPV 9.1 6.0 - 12.0 fL    Platelets 378 140 - 450 10*3/mm3    Neutrophil % 72.2 42.7 - 76.0 %    Lymphocyte % 20.0 19.6 - 45.3 %    Monocyte % 5.7 5.0 - 12.0 %    Eosinophil % 1.5 0.3 - 6.2 %    Basophil % 0.4 0.0 - 1.5 %    Immature Grans % 0.2 0.0 - 0.5 %    Neutrophils, Absolute 5.85 1.70 - 7.00 10*3/mm3    Lymphocytes, Absolute 1.62 0.70 - 3.10 10*3/mm3    Monocytes, Absolute 0.46 0.10 - 0.90 10*3/mm3    Eosinophils, Absolute 0.12 0.00 - 0.40 10*3/mm3    Basophils, Absolute 0.03 0.00 - 0.20 10*3/mm3    Immature Grans, Absolute 0.02 0.00 - 0.05 10*3/mm3    nRBC 0.0 0.0 - 0.2 /100 WBC   Green Top (Gel)    Collection Time: 12/19/24  9:50 AM   Result Value Ref Range    Extra Tube Hold for add-ons.    Lavender Top    Collection Time: 12/19/24  9:50 AM   Result Value Ref Range    Extra Tube hold for add-on    Gold Top - SST    Collection Time: 12/19/24  9:50 AM   Result Value Ref Range    Extra Tube Hold for add-ons.    Light Blue Top    Collection Time: 12/19/24  9:50 AM   Result Value Ref Range    Extra Tube Hold for add-ons.    Gray Top    Collection Time: 12/19/24  9:50 AM   Result Value Ref Range    Extra Tube Hold for add-ons.    Urine Culture - Urine, Urine, Clean Catch    Collection Time: 12/19/24 11:13 AM    Specimen: Urine, Clean Catch   Result Value Ref Range    Urine Culture No growth    Urinalysis With Microscopic If Indicated (No Culture) - Urine, Clean Catch    Collection Time: 12/19/24 11:13 AM    Specimen: Urine, Clean Catch   Result Value Ref Range    Color, UA Yellow Yellow, Straw    Appearance, UA Cloudy (A)  Clear    pH, UA 6.5 5.0 - 8.0    Specific Gravity, UA 1.025 1.005 - 1.030    Glucose, UA Negative Negative    Ketones, UA Trace (A) Negative    Bilirubin, UA Negative Negative    Blood, UA Negative Negative    Protein, UA Negative Negative    Leuk Esterase, UA Trace (A) Negative    Nitrite, UA Negative Negative    Urobilinogen, UA 1.0 E.U./dL 0.2 - 1.0 E.U./dL   Urinalysis, Microscopic Only - Urine, Clean Catch    Collection Time: 12/19/24 11:13 AM    Specimen: Urine, Clean Catch   Result Value Ref Range    RBC, UA 0-2 None Seen, 0-2 /HPF    WBC, UA 11-20 (A) None Seen, 0-2 /HPF    Bacteria, UA 2+ (A) None Seen /HPF    Squamous Epithelial Cells, UA 21-30 (A) None Seen, 0-2 /HPF    Hyaline Casts, UA 0-2 None Seen /LPF    Methodology Automated Microscopy        Results             Procedures        Assessment and Plan CC Problem List  Visit Diagnosis   ROS  Review (Popup)  Health Maintenance  Quality  BestPractice  Medications  SmartSets  SnapShot Encounters  Media   Diagnoses and all orders for this visit:    1. Epigastric pain (Primary)  -     Cancel: CT Abdomen With Contrast; Future  -     Comprehensive metabolic panel; Future  -     CBC w AUTO Differential; Future  -     Lipase; Future  -     CT Abdomen With Contrast; Future    2. Elevated lipase  -     Comprehensive metabolic panel; Future  -     CBC w AUTO Differential; Future  -     Lipase; Future  -     CT Abdomen With Contrast; Future    Epigastric pain with radiation to the right upper quadrant, severe, worsening.    Assessment & Plan    Concern for acute versus chronic pancreatitis, pancreatic necrosis, other abnormal abdominal pathologies including the stomach, intestines, liver, pancreas.  I will repeat labs including the lipase level and order a stat CT.             Follow Up Instructions Charge Capture  Follow-up Communications   No follow-ups on file.  Patient was given instructions and counseling regarding her condition or for health  maintenance advice. Please see specific information pulled into the AVS if appropriate.       Transcribed from ambient dictation for Latrell Mathew DO by Latrell Mathew DO.  12/30/24   11:49 EST

## 2024-12-31 ENCOUNTER — ANESTHESIA EVENT (OUTPATIENT)
Dept: GASTROENTEROLOGY | Facility: HOSPITAL | Age: 49
End: 2024-12-31
Payer: MEDICAID

## 2024-12-31 ENCOUNTER — OFFICE VISIT (OUTPATIENT)
Dept: GASTROENTEROLOGY | Facility: CLINIC | Age: 49
End: 2024-12-31
Payer: MEDICAID

## 2024-12-31 VITALS
HEART RATE: 85 BPM | HEIGHT: 65 IN | DIASTOLIC BLOOD PRESSURE: 66 MMHG | WEIGHT: 217.4 LBS | SYSTOLIC BLOOD PRESSURE: 119 MMHG | BODY MASS INDEX: 36.22 KG/M2

## 2024-12-31 DIAGNOSIS — R13.19 ESOPHAGEAL DYSPHAGIA: ICD-10-CM

## 2024-12-31 DIAGNOSIS — R11.10 VOMITING, UNSPECIFIED VOMITING TYPE, UNSPECIFIED WHETHER NAUSEA PRESENT: ICD-10-CM

## 2024-12-31 DIAGNOSIS — R10.11 RIGHT UPPER QUADRANT PAIN: ICD-10-CM

## 2024-12-31 DIAGNOSIS — R10.13 EPIGASTRIC PAIN: Primary | ICD-10-CM

## 2024-12-31 PROCEDURE — 3074F SYST BP LT 130 MM HG: CPT | Performed by: NURSE PRACTITIONER

## 2024-12-31 PROCEDURE — 99214 OFFICE O/P EST MOD 30 MIN: CPT | Performed by: NURSE PRACTITIONER

## 2024-12-31 PROCEDURE — 3078F DIAST BP <80 MM HG: CPT | Performed by: NURSE PRACTITIONER

## 2024-12-31 PROCEDURE — 1160F RVW MEDS BY RX/DR IN RCRD: CPT | Performed by: NURSE PRACTITIONER

## 2024-12-31 PROCEDURE — 1159F MED LIST DOCD IN RCRD: CPT | Performed by: NURSE PRACTITIONER

## 2024-12-31 RX ORDER — PANTOPRAZOLE SODIUM 40 MG/1
40 TABLET, DELAYED RELEASE ORAL DAILY
Qty: 90 TABLET | Refills: 1 | Status: SHIPPED | OUTPATIENT
Start: 2024-12-31

## 2024-12-31 NOTE — ANESTHESIA PREPROCEDURE EVALUATION
Anesthesia Evaluation     Patient summary reviewed and Nursing notes reviewed   history of anesthetic complications:  PONV  NPO Solid Status: > 8 hours  NPO Liquid Status: > 2 hours           Airway   Mallampati: I  TM distance: >3 FB  Neck ROM: full  No difficulty expected  Dental    (+) poor dentition    Pulmonary - normal exam   (-) sleep apnea, not a smoker  Cardiovascular   Exercise tolerance: good (4-7 METS)    Rhythm: regular  Rate: normal    (+) hypertension      Neuro/Psych  (+) psychiatric history Depression  GI/Hepatic/Renal/Endo    (+) obesity, thyroid problem hypothyroidism    Musculoskeletal     (+) neck pain, neck stiffness  Abdominal    Substance History   (+) alcohol use     OB/GYN          Other   arthritis,     ROS/Med Hx Other: Epigastric pain, dysphagia    S/P tubal ligation    EKG 10/16/24: HR 78,   Sinus rhythm  Borderline  T wave abnormalities  When compared with ECG of 22-Mar-2022 15:47:14,  Significant rate decrease    Ondansetron ordered preop                  Anesthesia Plan    ASA 2     general   total IV anesthesia  (Total IV Anesthesia    Patient understands anesthesia not responsible for dental damage.      Discussed risks with pt including aspiration, allergic reactions, apnea, advanced airway placement. Pt verbalized understanding. All questions answered.     )  intravenous induction     Anesthetic plan, risks, benefits, and alternatives have been provided, discussed and informed consent has been obtained with: patient.  Pre-procedure education provided  Plan discussed with CRNA.      CODE STATUS:

## 2024-12-31 NOTE — PROGRESS NOTES
Chief Complaint     Difficulty Swallowing, Abdominal Pain, and Colon Cancer Screening    History of Present Illness     Kerry Foster is a 49 y.o. female who presents to [facility] on referral from Latrell Mathew DO for a gastroenterology evaluation of dysphagia, abdominal pain and screening for colon cancer.      She was last evaluated 1/26/2021 and was referred for esophageal manometry.  She's been lost to f/u since that time.     She reports pain in epigastric region that's been present for about 2 weeks.  States that she also has sharp pain in RUQ that radiates to epigastric region.  States that epigastric pain is constant.  Stabbing pain is present 2-3 times per hour and is severe.  She rates pain a 6 on the pain scale of 1-10.      She feels like food is sitting in chest and is painful.  She will sometimes vomit due to discomfort.  Vomiting occurs up to two times per meal.      Prescribed Carafate following ER visit.  She's been taking this but hasn't noticed improvement.      Reports alternating between diarrhea and constipation since having gallbladder removed.       History      Past Medical History:   Diagnosis Date    Arthritis     Breast pain     HTN (hypertension)     Overdose     Vaginal yeast infection        Past Surgical History:   Procedure Laterality Date    ANTERIOR CERVICAL FUSION      BREAST BIOPSY      GALLBLADDER SURGERY      HYSTERECTOMY      LASER ABLATION      TUBAL ABDOMINAL LIGATION      UPPER GASTROINTESTINAL ENDOSCOPY         Family History   Problem Relation Age of Onset    Lung cancer Mother     Cancer Brother         ORAL/MOUTH    Other (Posttraumatic stress disorder) Brother     Drug abuse Brother     Cancer Brother         BONE    Breast cancer Maternal Grandmother         70S    Pancreatic cancer Maternal Grandfather         UNKNOWN AGE     Breast cancer Paternal Grandmother         UNKNOWN AGE     Breast cancer Maternal Aunt          UNKNOWN AGE     Lung cancer Maternal Aunt     Canavan disease Maternal Aunt     Cancer Maternal Aunt         BLADDER    Cancer Paternal Uncle         UNKNOWN ORGIN     Breast cancer Cousin         30S    Breast cancer Cousin         30S        Current Medications        Current Outpatient Medications:     levothyroxine (SYNTHROID, LEVOTHROID) 137 MCG tablet, Take 1 tablet by mouth Every Morning. Indications: Underactive Thyroid, Disp: 90 tablet, Rfl: 0    lisinopril (PRINIVIL,ZESTRIL) 10 MG tablet, Take 1 tablet by mouth Daily. Indications: High Blood Pressure, Disp: , Rfl:     ondansetron ODT (ZOFRAN-ODT) 4 MG disintegrating tablet, Place 1 tablet on the tongue Every 8 (Eight) Hours As Needed for Nausea or Vomiting., Disp: 30 tablet, Rfl: 0    pantoprazole (PROTONIX) 40 MG EC tablet, Take 1 tablet by mouth Daily., Disp: 90 tablet, Rfl: 1    sucralfate (CARAFATE) 1 g tablet, Take 1 tablet by mouth 3 (Three) Times a Day With Meals. May dissolve tablets in a small amount of water to help with swallowing, Disp: 28 tablet, Rfl: 0    venlafaxine XR (EFFEXOR-XR) 150 MG 24 hr capsule, Take 1 capsule by mouth Daily With Breakfast. Indications: Major Depressive Disorder, Disp: 30 capsule, Rfl: 0    hyoscyamine (LEVSIN) 0.125 MG SL tablet, Take 1 tablet by mouth 4 (Four) Times a Day With Meals & at Bedtime., Disp: 120 tablet, Rfl: 5  No current facility-administered medications for this visit.     Allergies     Allergies   Allergen Reactions    Carbamazepine Nausea And Vomiting, Palpitations, Rash and Shortness Of Breath     dizziness, N/V    Codeine Hives and Rash     HIVES    Morphine Anaphylaxis and Shortness Of Breath    Oxycodone Other (See Comments)     hallucinations       Social History       Social History     Social History Narrative    Not on file       Immunizations     Immunization:    There is no immunization history on file for this patient.       Objective     Objective     Vital Signs:   /66 (BP  "Location: Left arm, Patient Position: Sitting, Cuff Size: Adult)   Pulse 85   Ht 165.1 cm (65\")   Wt 98.6 kg (217 lb 6.4 oz)   BMI 36.18 kg/m²       Physical Exam    Results      Result Review :   The following data was reviewed by: DELPHINE Robles on 12/31/2024:    CBC w/diff          12/18/2024    09:27 12/19/2024    09:50 12/30/2024    13:11   CBC w/Diff   WBC 8.27  8.10  8.28    RBC 4.32  4.29  4.20    Hemoglobin 13.2  12.7  13.1    Hematocrit 39.6  39.2  39.0    MCV 91.7  91.4  92.9    MCH 30.6  29.6  31.2    MCHC 33.3  32.4  33.6    RDW 12.3  13.2  13.0    Platelets 426  378  418    Neutrophil Rel % 74.0  72.2  71.2    Immature Granulocyte Rel % 0.2  0.2  0.2    Lymphocyte Rel % 17.7  20.0  20.7    Monocyte Rel % 6.2  5.7  6.4    Eosinophil Rel % 1.5  1.5  1.0    Basophil Rel % 0.4  0.4  0.5      CMP          12/18/2024    09:27 12/19/2024    09:50 12/30/2024    13:11   CMP   Glucose 106  114  105    BUN 9  10  8    Creatinine 0.76  0.69  0.70    EGFR 96.8  107.2  106.8    Sodium 141  137  133    Potassium 4.8  3.7  3.9    Chloride 106  101  98    Calcium 10.0  9.3  9.3    Total Protein 7.7  8.0  7.4    Albumin 4.2  4.1  3.9    Globulin 3.5  3.9  3.5    Total Bilirubin 0.2  0.4  0.2    Alkaline Phosphatase 87  84  85    AST (SGOT) 22  20  19    ALT (SGPT) 19  17  19    Albumin/Globulin Ratio 1.2  1.1  1.1    BUN/Creatinine Ratio 11.8  14.5  11.4    Anion Gap 9.0  12.8  9.6        Lipase   Lipase   Date Value Ref Range Status   12/30/2024 75 (H) 13 - 60 U/L Final   05/22/2024 53 11 - 82 Units/Liter Final     12/30/2024 CT abdomen with contrast-no acute process.  Left nephrolithiasis.    11/20/2020 EGD-Schatzki's ring found in the GE junction.  Dilated to 16.5 mm.  Grade A esophagitis in the GE junction, biopsy-active esophagitis consistent with reflux esophagitis, no intestinal metaplasia.  Normal mucosa noted in the middle third of the esophagus, biopsy -chronic inflammation.  A sliding hiatal " hernia was seen measuring 2 cm.  Diffuse continuous erythema of the mucosa noted in the antrum and gastric body  Erythema of the mucosa noted in the first part of the duodenum.  Normal second portion of the duodenum.     Assessment and Plan        Assessment and Plan    Diagnoses and all orders for this visit:    1. Epigastric pain (Primary)  -     pantoprazole (PROTONIX) 40 MG EC tablet; Take 1 tablet by mouth Daily.  Dispense: 90 tablet; Refill: 1  -     Case Request; Standing  -     Case Request  -     hyoscyamine (LEVSIN) 0.125 MG SL tablet; Take 1 tablet by mouth 4 (Four) Times a Day With Meals & at Bedtime.  Dispense: 120 tablet; Refill: 5    2. Vomiting, unspecified vomiting type, unspecified whether nausea present  -     Case Request; Standing  -     Case Request    3. Esophageal dysphagia  -     Case Request; Standing  -     Case Request    4. Right upper quadrant pain  -     Case Request; Standing  -     Case Request    Other orders  -     Follow Anesthesia Guidelines / Protocol; Future  -     Verify NPO; Standing        ESOPHAGOGASTRODUODENOSCOPY (N/A)The risk of the endoscopy were discussed in detail. Possible risks/complications, benefits, and alternatives to surgical or invasive procedure have been explained to patient and/or legal guardian; risks include bleeding, infection, and perforation. Patient has been evaluated and can tolerate anesthesia and/or sedation.     She is due for colon cancer screening, however will defer scheduling until epigastric pain/vomiting is resolved as she likely would not be able to complete bowel prep.        Follow Up        Follow Up   Return in about 3 months (around 3/31/2025).  Patient was given instructions and counseling regarding her condition or for health maintenance advice. Please see specific information pulled into the AVS if appropriate.

## 2024-12-31 NOTE — PRE-PROCEDURE INSTRUCTIONS
"Instructed on date and arrival time of 0800. Instructed that arrival time is not procedure time but allows time to prepare for procedure. Come to entrance \"C\".  Must have  over age 18 to drive home.  May have two visitors; however, children under 12 must stay in waiting room.  Discussed diet/NPO.  May take medications as usual except for blood thinners, diabetic medications, or weight loss medications.  Verbalized understanding of instructions given.  Instructed to call for questions or concerns.  "

## 2025-01-02 ENCOUNTER — ANESTHESIA (OUTPATIENT)
Dept: GASTROENTEROLOGY | Facility: HOSPITAL | Age: 50
End: 2025-01-02
Payer: MEDICAID

## 2025-01-02 ENCOUNTER — HOSPITAL ENCOUNTER (OUTPATIENT)
Facility: HOSPITAL | Age: 50
Setting detail: HOSPITAL OUTPATIENT SURGERY
Discharge: HOME | End: 2025-01-02
Attending: INTERNAL MEDICINE | Admitting: INTERNAL MEDICINE
Payer: MEDICAID

## 2025-01-02 VITALS
OXYGEN SATURATION: 100 % | SYSTOLIC BLOOD PRESSURE: 113 MMHG | TEMPERATURE: 98.5 F | RESPIRATION RATE: 22 BRPM | HEART RATE: 77 BPM | WEIGHT: 213.63 LBS | DIASTOLIC BLOOD PRESSURE: 86 MMHG | BODY MASS INDEX: 35.55 KG/M2

## 2025-01-02 DIAGNOSIS — R11.10 VOMITING, UNSPECIFIED VOMITING TYPE, UNSPECIFIED WHETHER NAUSEA PRESENT: ICD-10-CM

## 2025-01-02 DIAGNOSIS — R10.13 EPIGASTRIC PAIN: ICD-10-CM

## 2025-01-02 DIAGNOSIS — R10.11 RIGHT UPPER QUADRANT PAIN: ICD-10-CM

## 2025-01-02 DIAGNOSIS — R13.19 ESOPHAGEAL DYSPHAGIA: ICD-10-CM

## 2025-01-02 PROCEDURE — 25810000003 LACTATED RINGERS PER 1000 ML

## 2025-01-02 PROCEDURE — 25010000002 PROPOFOL 10 MG/ML EMULSION: Performed by: NURSE ANESTHETIST, CERTIFIED REGISTERED

## 2025-01-02 PROCEDURE — 25010000002 ONDANSETRON PER 1 MG

## 2025-01-02 PROCEDURE — C1726 CATH, BAL DIL, NON-VASCULAR: HCPCS | Performed by: INTERNAL MEDICINE

## 2025-01-02 PROCEDURE — 88305 TISSUE EXAM BY PATHOLOGIST: CPT | Performed by: INTERNAL MEDICINE

## 2025-01-02 PROCEDURE — 25010000002 LIDOCAINE PF 2% 2 % SOLUTION: Performed by: NURSE ANESTHETIST, CERTIFIED REGISTERED

## 2025-01-02 RX ORDER — SODIUM CHLORIDE, SODIUM LACTATE, POTASSIUM CHLORIDE, CALCIUM CHLORIDE 600; 310; 30; 20 MG/100ML; MG/100ML; MG/100ML; MG/100ML
30 INJECTION, SOLUTION INTRAVENOUS CONTINUOUS
Status: DISCONTINUED | OUTPATIENT
Start: 2025-01-02 | End: 2025-01-02 | Stop reason: HOSPADM

## 2025-01-02 RX ORDER — PROPOFOL 10 MG/ML
VIAL (ML) INTRAVENOUS AS NEEDED
Status: DISCONTINUED | OUTPATIENT
Start: 2025-01-02 | End: 2025-01-02 | Stop reason: SURG

## 2025-01-02 RX ORDER — ONDANSETRON 2 MG/ML
4 INJECTION INTRAMUSCULAR; INTRAVENOUS ONCE
Status: COMPLETED | OUTPATIENT
Start: 2025-01-02 | End: 2025-01-02

## 2025-01-02 RX ORDER — LIDOCAINE HYDROCHLORIDE 20 MG/ML
INJECTION, SOLUTION EPIDURAL; INFILTRATION; INTRACAUDAL; PERINEURAL AS NEEDED
Status: DISCONTINUED | OUTPATIENT
Start: 2025-01-02 | End: 2025-01-02 | Stop reason: SURG

## 2025-01-02 RX ADMIN — PROPOFOL 200 MCG/KG/MIN: 10 INJECTION, EMULSION INTRAVENOUS at 08:56

## 2025-01-02 RX ADMIN — LIDOCAINE HYDROCHLORIDE 50 MG: 20 INJECTION, SOLUTION EPIDURAL; INFILTRATION; INTRACAUDAL; PERINEURAL at 08:56

## 2025-01-02 RX ADMIN — SODIUM CHLORIDE, POTASSIUM CHLORIDE, SODIUM LACTATE AND CALCIUM CHLORIDE 30 ML/HR: 600; 310; 30; 20 INJECTION, SOLUTION INTRAVENOUS at 08:21

## 2025-01-02 RX ADMIN — PROPOFOL 100 MG: 10 INJECTION, EMULSION INTRAVENOUS at 08:56

## 2025-01-02 RX ADMIN — ONDANSETRON 4 MG: 2 INJECTION INTRAMUSCULAR; INTRAVENOUS at 08:32

## 2025-01-02 NOTE — H&P
Pre Procedure History & Physical    Chief Complaint:   RUQ/epigastric pain, vomiting, dysphagia    Subjective     HPI:   50 yo F here for eval of RUQ/epigastric pain, vomiting, dysphagia.    Past Medical History:   Past Medical History:   Diagnosis Date    Arthritis     Breast pain     HTN (hypertension)     Overdose     Vaginal yeast infection        Past Surgical History:  Past Surgical History:   Procedure Laterality Date    ANTERIOR CERVICAL FUSION      BREAST BIOPSY      GALLBLADDER SURGERY      HYSTERECTOMY      LASER ABLATION      TUBAL ABDOMINAL LIGATION      UPPER GASTROINTESTINAL ENDOSCOPY         Family History:  Family History   Problem Relation Age of Onset    Lung cancer Mother     Cancer Brother         ORAL/MOUTH    Other (Posttraumatic stress disorder) Brother     Drug abuse Brother     Cancer Brother         BONE    Breast cancer Maternal Grandmother         70S    Pancreatic cancer Maternal Grandfather         UNKNOWN AGE     Breast cancer Paternal Grandmother         UNKNOWN AGE     Breast cancer Maternal Aunt         UNKNOWN AGE     Lung cancer Maternal Aunt     Canavan disease Maternal Aunt     Cancer Maternal Aunt         BLADDER    Cancer Paternal Uncle         UNKNOWN ORGIN     Breast cancer Cousin         30S    Breast cancer Cousin         30S       Social History:   reports that she has never smoked. She has never been exposed to tobacco smoke. She has never used smokeless tobacco. She reports that she does not currently use alcohol. She reports that she does not use drugs.    Medications:   Medications Prior to Admission   Medication Sig Dispense Refill Last Dose/Taking    hyoscyamine (LEVSIN) 0.125 MG SL tablet Take 1 tablet by mouth 4 (Four) Times a Day With Meals & at Bedtime. 120 tablet 5     levothyroxine (SYNTHROID, LEVOTHROID) 137 MCG tablet Take 1 tablet by mouth Every Morning. Indications: Underactive Thyroid 90 tablet 0     lisinopril (PRINIVIL,ZESTRIL) 10 MG tablet Take 1  tablet by mouth Daily. Indications: High Blood Pressure       ondansetron ODT (ZOFRAN-ODT) 4 MG disintegrating tablet Place 1 tablet on the tongue Every 8 (Eight) Hours As Needed for Nausea or Vomiting. 30 tablet 0     pantoprazole (PROTONIX) 40 MG EC tablet Take 1 tablet by mouth Daily. 90 tablet 1     sucralfate (CARAFATE) 1 g tablet Take 1 tablet by mouth 3 (Three) Times a Day With Meals. May dissolve tablets in a small amount of water to help with swallowing 28 tablet 0     venlafaxine XR (EFFEXOR-XR) 150 MG 24 hr capsule Take 1 capsule by mouth Daily With Breakfast. Indications: Major Depressive Disorder 30 capsule 0        Allergies:  Carbamazepine, Codeine, Morphine, and Oxycodone    ROS:    Pertinent items are noted in HPI     Objective     Weight 96.9 kg (213 lb 10 oz), not currently breastfeeding.    Physical Exam   Constitutional: Pt is oriented to person, place, and time and well-developed, well-nourished, and in no distress.   Mouth/Throat: Oropharynx is clear and moist.   Neck: Normal range of motion.   Cardiovascular: Normal rate, regular rhythm and normal heart sounds.    Pulmonary/Chest: Effort normal and breath sounds normal.   Abdominal: Soft. Nontender  Skin: Skin is warm and dry.   Psychiatric: Mood, memory, affect and judgment normal.     Assessment & Plan     Diagnosis:  RUQ/epigastric pain, vomiting, dysphagia    Anticipated Surgical Procedure:  EGD    The risks, benefits, and alternatives of this procedure have been discussed with the patient or the responsible party- the patient understands and agrees to proceed.

## 2025-01-02 NOTE — ANESTHESIA POSTPROCEDURE EVALUATION
Patient: Kerry Foster    Procedure Summary       Date: 01/02/25 Room / Location: [facility] ENDOSCOPY 3 / [facility] ENDOSCOPY    Anesthesia Start: 0852 Anesthesia Stop: 0909    Procedure: ESOPHAGOGASTRODUODENOSCOPY WITH BIOPSIES, BALLOON DILATION 15-18 Diagnosis:       Epigastric pain      Vomiting, unspecified vomiting type, unspecified whether nausea present      Esophageal dysphagia      Right upper quadrant pain      (Epigastric pain [R10.13])      (Vomiting, unspecified vomiting type, unspecified whether nausea present [R11.10])      (Esophageal dysphagia [R13.19])      (Right upper quadrant pain [R10.11])    Surgeons: Smita Porras MD Provider: Britany Cerrato CRNA    Anesthesia Type: general ASA Status: 2            Anesthesia Type: general    Vitals  Vitals Value Taken Time   /77 01/02/25 0920   Temp 36.9 °C (98.5 °F) 01/02/25 0910   Pulse 75 01/02/25 0923   Resp 19 01/02/25 0920   SpO2 95 % 01/02/25 0923   Vitals shown include unfiled device data.        Post Anesthesia Care and Evaluation    Post-procedure mental status: acceptable.  Pain management: satisfactory to patient    Airway patency: patent  Anesthetic complications: No anesthetic complications    Cardiovascular status: acceptable  Respiratory status: acceptable    Comments: Per chart review

## 2025-01-03 LAB
CYTO UR: NORMAL
LAB AP CASE REPORT: NORMAL
LAB AP CLINICAL INFORMATION: NORMAL
PATH REPORT.FINAL DX SPEC: NORMAL
PATH REPORT.GROSS SPEC: NORMAL

## 2025-01-07 ENCOUNTER — TELEPHONE (OUTPATIENT)
Dept: GASTROENTEROLOGY | Facility: CLINIC | Age: 50
End: 2025-01-07
Payer: MEDICAID

## 2025-01-07 NOTE — TELEPHONE ENCOUNTER
----- Message from Orquidea Sanz sent at 1/6/2025  4:29 PM EST -----  Biopsies are consistent with reflux esophagitis.  Continue current PPI therapy and keep scheduled follow-up.

## 2025-01-28 ENCOUNTER — TELEPHONE (OUTPATIENT)
Dept: GASTROENTEROLOGY | Facility: CLINIC | Age: 50
End: 2025-01-28
Payer: MEDICAID

## 2025-01-28 NOTE — TELEPHONE ENCOUNTER
Left voice message for patient to return call regarding rescheduling her appointment on 4/3/25 due to Ava Sanz being in a meeting at 11:30. She can be moved up to 8:30,9:45 or 10:15 on the same day.

## 2025-01-29 NOTE — TELEPHONE ENCOUNTER
Attempted to call this patient again to get her appointment on 04/03/2025 at 11:30 moved to earlier in the day due to Ava Sanz being in a meeting. Just need to move the appointment up to an earlier time on the same day.

## 2025-03-03 ENCOUNTER — HOSPITAL ENCOUNTER (EMERGENCY)
Facility: HOSPITAL | Age: 50
Discharge: HOME | End: 2025-03-03
Attending: EMERGENCY MEDICINE | Admitting: EMERGENCY MEDICINE
Payer: MEDICAID

## 2025-03-03 VITALS
RESPIRATION RATE: 18 BRPM | BODY MASS INDEX: 39.96 KG/M2 | HEIGHT: 63 IN | OXYGEN SATURATION: 100 % | SYSTOLIC BLOOD PRESSURE: 108 MMHG | WEIGHT: 225.53 LBS | TEMPERATURE: 98.2 F | HEART RATE: 77 BPM | DIASTOLIC BLOOD PRESSURE: 76 MMHG

## 2025-03-03 DIAGNOSIS — R25.2 MUSCLE CRAMPS: Primary | ICD-10-CM

## 2025-03-03 LAB
ALBUMIN SERPL-MCNC: 4.4 G/DL (ref 3.5–5.2)
ALBUMIN/GLOB SERPL: 1 G/DL
ALP SERPL-CCNC: 95 U/L (ref 39–117)
ALT SERPL W P-5'-P-CCNC: 15 U/L (ref 1–33)
ANION GAP SERPL CALCULATED.3IONS-SCNC: 12 MMOL/L (ref 5–15)
AST SERPL-CCNC: 16 U/L (ref 1–32)
BASOPHILS # BLD AUTO: 0.04 10*3/MM3 (ref 0–0.2)
BASOPHILS NFR BLD AUTO: 0.5 % (ref 0–1.5)
BILIRUB SERPL-MCNC: <0.2 MG/DL (ref 0–1.2)
BILIRUB UR QL STRIP: NEGATIVE
BUN SERPL-MCNC: 16 MG/DL (ref 6–20)
BUN/CREAT SERPL: 17.2 (ref 7–25)
CALCIUM SPEC-SCNC: 9.5 MG/DL (ref 8.6–10.5)
CHLORIDE SERPL-SCNC: 101 MMOL/L (ref 98–107)
CLARITY UR: CLEAR
CO2 SERPL-SCNC: 28 MMOL/L (ref 22–29)
COLOR UR: YELLOW
CREAT SERPL-MCNC: 0.93 MG/DL (ref 0.57–1)
DEPRECATED RDW RBC AUTO: 45.3 FL (ref 37–54)
EGFRCR SERPLBLD CKD-EPI 2021: 76 ML/MIN/1.73
EOSINOPHIL # BLD AUTO: 0.03 10*3/MM3 (ref 0–0.4)
EOSINOPHIL NFR BLD AUTO: 0.3 % (ref 0.3–6.2)
ERYTHROCYTE [DISTWIDTH] IN BLOOD BY AUTOMATED COUNT: 13.3 % (ref 12.3–15.4)
FLUAV SUBTYP SPEC NAA+PROBE: NOT DETECTED
FLUBV RNA ISLT QL NAA+PROBE: NOT DETECTED
GLOBULIN UR ELPH-MCNC: 4.3 GM/DL
GLUCOSE SERPL-MCNC: 94 MG/DL (ref 65–99)
GLUCOSE UR STRIP-MCNC: NEGATIVE MG/DL
HCT VFR BLD AUTO: 41.3 % (ref 34–46.6)
HGB BLD-MCNC: 13.4 G/DL (ref 12–15.9)
HGB UR QL STRIP.AUTO: NEGATIVE
HOLD SPECIMEN: NORMAL
IMM GRANULOCYTES # BLD AUTO: 0.03 10*3/MM3 (ref 0–0.05)
IMM GRANULOCYTES NFR BLD AUTO: 0.3 % (ref 0–0.5)
KETONES UR QL STRIP: NEGATIVE
LEUKOCYTE ESTERASE UR QL STRIP.AUTO: NEGATIVE
LYMPHOCYTES # BLD AUTO: 1.73 10*3/MM3 (ref 0.7–3.1)
LYMPHOCYTES NFR BLD AUTO: 20 % (ref 19.6–45.3)
MCH RBC QN AUTO: 30.1 PG (ref 26.6–33)
MCHC RBC AUTO-ENTMCNC: 32.4 G/DL (ref 31.5–35.7)
MCV RBC AUTO: 92.8 FL (ref 79–97)
MONOCYTES # BLD AUTO: 0.62 10*3/MM3 (ref 0.1–0.9)
MONOCYTES NFR BLD AUTO: 7.2 % (ref 5–12)
NEUTROPHILS NFR BLD AUTO: 6.22 10*3/MM3 (ref 1.7–7)
NEUTROPHILS NFR BLD AUTO: 71.7 % (ref 42.7–76)
NITRITE UR QL STRIP: NEGATIVE
NRBC BLD AUTO-RTO: 0 /100 WBC (ref 0–0.2)
NT-PROBNP SERPL-MCNC: 45.7 PG/ML (ref 0–450)
PH UR STRIP.AUTO: 5.5 [PH] (ref 5–8)
PLATELET # BLD AUTO: 439 10*3/MM3 (ref 140–450)
PMV BLD AUTO: 9.2 FL (ref 6–12)
POTASSIUM SERPL-SCNC: 3.8 MMOL/L (ref 3.5–5.2)
PROT SERPL-MCNC: 8.7 G/DL (ref 6–8.5)
PROT UR QL STRIP: NEGATIVE
RBC # BLD AUTO: 4.45 10*6/MM3 (ref 3.77–5.28)
RSV RNA NPH QL NAA+NON-PROBE: NOT DETECTED
SARS-COV-2 RNA RESP QL NAA+PROBE: NOT DETECTED
SODIUM SERPL-SCNC: 141 MMOL/L (ref 136–145)
SP GR UR STRIP: 1.03 (ref 1–1.03)
UROBILINOGEN UR QL STRIP: NORMAL
WBC NRBC COR # BLD AUTO: 8.67 10*3/MM3 (ref 3.4–10.8)
WHOLE BLOOD HOLD COAG: NORMAL

## 2025-03-03 PROCEDURE — 81003 URINALYSIS AUTO W/O SCOPE: CPT

## 2025-03-03 PROCEDURE — 25010000002 KETOROLAC TROMETHAMINE PER 15 MG

## 2025-03-03 PROCEDURE — 96374 THER/PROPH/DIAG INJ IV PUSH: CPT

## 2025-03-03 PROCEDURE — 99283 EMERGENCY DEPT VISIT LOW MDM: CPT

## 2025-03-03 PROCEDURE — 83880 ASSAY OF NATRIURETIC PEPTIDE: CPT

## 2025-03-03 PROCEDURE — 96375 TX/PRO/DX INJ NEW DRUG ADDON: CPT

## 2025-03-03 PROCEDURE — 80053 COMPREHEN METABOLIC PANEL: CPT

## 2025-03-03 PROCEDURE — 25010000002 DEXAMETHASONE SODIUM PHOSPHATE 10 MG/ML SOLUTION

## 2025-03-03 PROCEDURE — 85025 COMPLETE CBC W/AUTO DIFF WBC: CPT

## 2025-03-03 PROCEDURE — 87637 SARSCOV2&INF A&B&RSV AMP PRB: CPT | Performed by: EMERGENCY MEDICINE

## 2025-03-03 PROCEDURE — 63710000001 ONDANSETRON ODT 4 MG TABLET DISPERSIBLE: Performed by: EMERGENCY MEDICINE

## 2025-03-03 RX ORDER — ONDANSETRON 4 MG/1
4 TABLET, ORALLY DISINTEGRATING ORAL ONCE
Status: COMPLETED | OUTPATIENT
Start: 2025-03-03 | End: 2025-03-03

## 2025-03-03 RX ORDER — LORAZEPAM 0.5 MG/1
0.5 TABLET ORAL ONCE
Status: COMPLETED | OUTPATIENT
Start: 2025-03-03 | End: 2025-03-03

## 2025-03-03 RX ORDER — KETOROLAC TROMETHAMINE 30 MG/ML
30 INJECTION, SOLUTION INTRAMUSCULAR; INTRAVENOUS ONCE
Status: COMPLETED | OUTPATIENT
Start: 2025-03-03 | End: 2025-03-03

## 2025-03-03 RX ORDER — ORPHENADRINE CITRATE 100 MG/1
100 TABLET ORAL 2 TIMES DAILY
Qty: 20 TABLET | Refills: 0 | Status: SHIPPED | OUTPATIENT
Start: 2025-03-03

## 2025-03-03 RX ORDER — DEXAMETHASONE SODIUM PHOSPHATE 10 MG/ML
6 INJECTION, SOLUTION INTRAMUSCULAR; INTRAVENOUS ONCE
Status: COMPLETED | OUTPATIENT
Start: 2025-03-03 | End: 2025-03-03

## 2025-03-03 RX ADMIN — ONDANSETRON 4 MG: 4 TABLET, ORALLY DISINTEGRATING ORAL at 13:55

## 2025-03-03 RX ADMIN — DEXAMETHASONE SODIUM PHOSPHATE 6 MG: 10 INJECTION, SOLUTION INTRAMUSCULAR; INTRAVENOUS at 15:59

## 2025-03-03 RX ADMIN — KETOROLAC TROMETHAMINE 30 MG: 30 INJECTION, SOLUTION INTRAMUSCULAR; INTRAVENOUS at 15:58

## 2025-03-03 RX ADMIN — LORAZEPAM 0.5 MG: 0.5 TABLET ORAL at 13:55

## 2025-03-03 NOTE — ED PROVIDER NOTES
Time: 12:47 PM EST  Date of encounter:  3/3/2025  Room number: 35/35  Independent Historian/Clinical History and Information was obtained by:   Patient    History is limited by: N/A    Chief Complaint: Bilateral lower extremity pain and right arm pain      History of Present Illness:  Patient is a 49 y.o. year old female who presents to the emergency department for evaluation of bilateral leg pain and right arm pain.  Patient states she has also had significant swelling over her entire body specifically her bilateral lower extremities.  Last night she described her skin feeling tight all over.  She states she took someone else's Lasix and then this morning had a weight loss of 9 pounds.  She has no history of congestive heart failure.  She states approximately a week ago she did have some lower back pain with unknown injury and then her leg pain developed soon after that.  She denies any injury.  Her right arm pain is described to started yesterday.  She has had no recent viral illnesses or unusual sect bites.  She is also not engage in any strenuous exercising, had no chest pain, cough, fevers, nausea, vomiting, or diarrhea.  Pain is described to be aching, a pressure sensation, and intermittent sharp pains to the top of bilateral lower extremities that start at her hips and goes all the way down the front of her leg.      HPI    Patient Care Team  Primary Care Provider: Latrell Mathew DO    Past Medical History:     Allergies   Allergen Reactions    Carbamazepine Nausea And Vomiting, Palpitations, Rash and Shortness Of Breath     dizziness, N/V    Codeine Hives and Rash     HIVES    Morphine Anaphylaxis and Shortness Of Breath    Oxycodone Other (See Comments)     hallucinations     Past Medical History:   Diagnosis Date    Arthritis     Breast pain     HTN (hypertension)     Overdose     PONV (postoperative nausea and vomiting)     Vaginal yeast infection      Past Surgical History:   Procedure Laterality Date     ANTERIOR CERVICAL FUSION      BREAST BIOPSY      ENDOSCOPY N/A 1/2/2025    Procedure: ESOPHAGOGASTRODUODENOSCOPY WITH BIOPSIES, BALLOON DILATION 15-18;  Surgeon: Smita Porras MD;  Location: [facility] ENDOSCOPY;  Service: Gastroenterology;  Laterality: N/A;  HIATAL HERNIA, GASTRITIS, SCHATZKI'S RING    GALLBLADDER SURGERY      HYSTERECTOMY      LASER ABLATION      TUBAL ABDOMINAL LIGATION      UPPER GASTROINTESTINAL ENDOSCOPY       Family History   Problem Relation Age of Onset    Lung cancer Mother     Cancer Brother         ORAL/MOUTH    Other (Posttraumatic stress disorder) Brother     Drug abuse Brother     Cancer Brother         BONE    Breast cancer Maternal Grandmother         70S    Pancreatic cancer Maternal Grandfather         UNKNOWN AGE     Breast cancer Paternal Grandmother         UNKNOWN AGE     Breast cancer Maternal Aunt         UNKNOWN AGE     Lung cancer Maternal Aunt     Canavan disease Maternal Aunt     Cancer Maternal Aunt         BLADDER    Cancer Paternal Uncle         UNKNOWN ORGIN     Breast cancer Cousin         30S    Breast cancer Cousin         30S       Home Medications:  Prior to Admission medications    Medication Sig Start Date End Date Taking? Authorizing Provider   hyoscyamine (LEVSIN) 0.125 MG SL tablet Take 1 tablet by mouth 4 (Four) Times a Day With Meals & at Bedtime. 12/31/24   Orquidea Sanz APRN   levothyroxine (SYNTHROID, LEVOTHROID) 137 MCG tablet Take 1 tablet by mouth Every Morning. Indications: Underactive Thyroid 12/18/24   Latrell Mathew DO   lisinopril (PRINIVIL,ZESTRIL) 10 MG tablet Take 1 tablet by mouth Daily. Indications: High Blood Pressure 10/10/24   Provider, MD Lilia   ondansetron ODT (ZOFRAN-ODT) 4 MG disintegrating tablet Place 1 tablet on the tongue Every 8 (Eight) Hours As Needed for Nausea or Vomiting. 12/18/24   Latrell Mathew DO   pantoprazole (PROTONIX) 40 MG EC tablet Take 1 tablet by mouth Daily. 12/31/24   Umu  "DELPHINE Hendricks   sucralfate (CARAFATE) 1 g tablet Take 1 tablet by mouth 3 (Three) Times a Day With Meals. May dissolve tablets in a small amount of water to help with swallowing 12/19/24   Memo Peña MD   venlafaxine XR (EFFEXOR-XR) 150 MG 24 hr capsule Take 1 capsule by mouth Daily With Breakfast. Indications: Major Depressive Disorder 11/25/24   Swati Scales MD        Social History:   Social History     Tobacco Use    Smoking status: Never     Passive exposure: Never    Smokeless tobacco: Never   Vaping Use    Vaping status: Never Used   Substance Use Topics    Alcohol use: Not Currently    Drug use: No         Review of Systems:  Review of Systems     I performed a review of systems today, which was all negative, except for the positives found in the HPI above.    Physical Exam:  /76   Pulse 77   Temp 98.2 °F (36.8 °C) (Oral)   Resp 18   Ht 160 cm (63\")   Wt 102 kg (225 lb 8.5 oz)   LMP  (LMP Unknown)   SpO2 100%   BMI 39.95 kg/m²     Physical Exam   General:  Awake alert no apparent distress    Head: Normocephalic, atraumatic, eyes PERRLA EOMI, nose normal, oropharynx normal    Neck: Supple, no meningismus, no cervical lymphadenopathy or JVD    Heart: Regular rate and rhythm, no murmurs or rubs, 2+ radial pulses, No pedal edema appreciated particularly pitting edema.    Lungs: Clear to auscultation bilaterally, no wheezing or rhonchi or rales, no respiratory distress    Abdomen: Soft, nontender, nondistended, no signs of peritonitis    Skin: Warm, dry, no rash    Musculoskeletal: Normal range of motion, no obvious deformities, no edema noted    Neurologic: Awake, alert, oriented x 3, no motor or sensory deficits appreciated    Psych: No suicidal or homicidal ideations, no psychosis          Procedures:  Procedures      Medical Decision Making:      Comorbidities that affect care:    Hypertension, arthritis, previous overdose, previous suicide attempt, psychiatric " illness    External Notes reviewed:    Previous Radiological Studies: I reviewed patient's previous cervical spine CT that occurred on 10/7/2024      The following orders were placed and all results were independently analyzed by me:  Orders Placed This Encounter   Procedures    COVID-19, FLU A/B, RSV PCR 1 HR TAT - Swab, Nasopharynx    Comprehensive Metabolic Panel    BNP    Urinalysis With Microscopic If Indicated (No Culture) - Urine, Clean Catch    CBC Auto Differential    CBC & Differential    Extra Tubes    Gold Top - SST    Light Blue Top       Medications Given in the Emergency Department:  Medications   ondansetron ODT (ZOFRAN-ODT) disintegrating tablet 4 mg (4 mg Oral Given 3/3/25 1355)   LORazepam (ATIVAN) tablet 0.5 mg (0.5 mg Oral Given 3/3/25 1355)   ketorolac (TORADOL) injection 30 mg (30 mg Intravenous Given 3/3/25 8248)   dexAMETHasone sodium phosphate injection 6 mg (6 mg Intravenous Given 3/3/25 1559)        ED Course:    ED Course as of 03/04/25 0902   Mon Mar 03, 2025   1536 Patient continues to complain of muscle pain to bilateral lower extremities and to right upper arm.  We discussed the fact that even though her potassium is within normal limits it could be on the low side for her personal limit.  We discussed rehydration with Gatorade and Pedialyte as well as not taking other peoples Lasix.  Patient and patient's  verbalized understanding.    They confirm that patient does have a follow-up appointment with her primary care provider next week. [MS]      ED Course User Index  [MS] Trinity Muro, DELPHINE       Labs:    Lab Results (last 24 hours)       Procedure Component Value Units Date/Time    COVID-19, FLU A/B, RSV PCR 1 HR TAT - Swab, Nasopharynx [[id number]]  (Normal) Collected: 03/03/25 1226    Specimen: Swab from Nasopharynx Updated: 03/03/25 1309     COVID19 Not Detected     Influenza A PCR Not Detected     Influenza B PCR Not Detected     RSV, PCR Not Detected     Narrative:      Fact sheet for providers: https://www.fda.gov/media/939626/download    Fact sheet for patients: https://www.fda.gov/media/346472/download    Test performed by PCR.    CBC & Differential [[id number]]  (Normal) Collected: 03/03/25 1321    Specimen: Blood Updated: 03/03/25 1330    Narrative:      The following orders were created for panel order CBC & Differential.  Procedure                               Abnormality         Status                     ---------                               -----------         ------                     CBC Auto Differential[[id number]]        Normal              Final result                 Please view results for these tests on the individual orders.    Comprehensive Metabolic Panel [[id number]]  (Abnormal) Collected: 03/03/25 1321    Specimen: Blood Updated: 03/03/25 1355     Glucose 94 mg/dL      BUN 16 mg/dL      Creatinine 0.93 mg/dL      Sodium 141 mmol/L      Potassium 3.8 mmol/L      Chloride 101 mmol/L      CO2 28.0 mmol/L      Calcium 9.5 mg/dL      Total Protein 8.7 g/dL      Albumin 4.4 g/dL      ALT (SGPT) 15 U/L      AST (SGOT) 16 U/L      Alkaline Phosphatase 95 U/L      Total Bilirubin <0.2 mg/dL      Globulin 4.3 gm/dL      A/G Ratio 1.0 g/dL      BUN/Creatinine Ratio 17.2     Anion Gap 12.0 mmol/L      eGFR 76.0 mL/min/1.73     Narrative:      GFR Categories in Chronic Kidney Disease (CKD)      GFR Category          GFR (mL/min/1.73)    Interpretation  G1                     90 or greater         Normal or high (1)  G2                      60-89                Mild decrease (1)  G3a                   45-59                Mild to moderate decrease  G3b                   30-44                Moderate to severe decrease  G4                    15-29                Severe decrease  G5                    14 or less           Kidney failure          (1)In the absence of evidence of kidney disease, neither GFR category G1 or G2 fulfill the criteria for  CKD.    eGFR calculation 2021 CKD-EPI creatinine equation, which does not include race as a factor    BNP [[id number]]  (Normal) Collected: 03/03/25 1321    Specimen: Blood Updated: 03/03/25 1351     proBNP 45.7 pg/mL     Narrative:      This assay is used as an aid in the diagnosis of individuals suspected of having heart failure. It can be used as an aid in the diagnosis of acute decompensated heart failure (ADHF) in patients presenting with signs and symptoms of ADHF to the emergency department (ED). In addition, NT-proBNP of <300 pg/mL indicates ADHF is not likely.    Age Range Result Interpretation  NT-proBNP Concentration (pg/mL:      <50             Positive            >450                   Gray                 300-450                    Negative             <300    50-75           Positive            >900                  Gray                300-900                  Negative            <300      >75             Positive            >1800                  Gray                300-1800                  Negative            <300    Urinalysis With Microscopic If Indicated (No Culture) - Urine, Clean Catch [[id number]]  (Normal) Collected: 03/03/25 1321    Specimen: Urine, Clean Catch Updated: 03/03/25 1332     Color, UA Yellow     Appearance, UA Clear     pH, UA 5.5     Specific Gravity, UA 1.026     Glucose, UA Negative     Ketones, UA Negative     Bilirubin, UA Negative     Blood, UA Negative     Protein, UA Negative     Leuk Esterase, UA Negative     Nitrite, UA Negative     Urobilinogen, UA 0.2 E.U./dL    Narrative:      Urine microscopic not indicated.    CBC Auto Differential [[id number]]  (Normal) Collected: 03/03/25 1321    Specimen: Blood Updated: 03/03/25 1330     WBC 8.67 10*3/mm3      RBC 4.45 10*6/mm3      Hemoglobin 13.4 g/dL      Hematocrit 41.3 %      MCV 92.8 fL      MCH 30.1 pg      MCHC 32.4 g/dL      RDW 13.3 %      RDW-SD 45.3 fl      MPV 9.2 fL      Platelets 439 10*3/mm3      Neutrophil % 71.7  %      Lymphocyte % 20.0 %      Monocyte % 7.2 %      Eosinophil % 0.3 %      Basophil % 0.5 %      Immature Grans % 0.3 %      Neutrophils, Absolute 6.22 10*3/mm3      Lymphocytes, Absolute 1.73 10*3/mm3      Monocytes, Absolute 0.62 10*3/mm3      Eosinophils, Absolute 0.03 10*3/mm3      Basophils, Absolute 0.04 10*3/mm3      Immature Grans, Absolute 0.03 10*3/mm3      nRBC 0.0 /100 WBC              Imaging:    No Radiology Exams Resulted Within Past 24 Hours      Differential Diagnosis and Discussion:    Extremity Pain: Differential diagnosis includes but is not limited to soft tissue sprain, tendonitis, tendon injury, dislocation, fracture, deep vein thrombosis, arterial insufficiency, osteoarthritis, bursitis, and ligamentous damage.    Labs were collected in the emergency department and all labs were reviewed and interpreted by me.    MDM     Amount and/or Complexity of Data Reviewed  Clinical lab tests: reviewed                   Patient Care Considerations:          Consultants/Shared Management Plan:    None    Social Determinants of Health:    Patient has presented with family members who are responsible, reliable and will ensure follow up care.      Disposition and Care Coordination:    Discharged: The patient is suitable and stable for discharge with no need for consideration of admission.    I have explained the patient´s condition, diagnoses and treatment plan based on the information available to me at this time. I have answered questions and addressed any concerns. The patient has a good  understanding of the patient´s diagnosis, condition, and treatment plan as can be expected at this point. The vital signs have been stable. The patient´s condition is stable and appropriate for discharge from the emergency department.      The patient will pursue further outpatient evaluation with the primary care physician or other designated or consulting physician as outlined in the discharge instructions. They  are agreeable to this plan of care and follow-up instructions have been explained in detail. The patient has received these instructions in written format and has expressed an understanding of the discharge instructions. The patient is aware that any significant change in condition or worsening of symptoms should prompt an immediate return to this or the closest emergency department or call to 911.    Final diagnoses:   Muscle cramps        ED Disposition       ED Disposition   Discharge    Condition   Stable    Comment   --               This medical record created using voice recognition software.       Trinity Muro, DELPHINE  03/04/25 0902

## 2025-03-03 NOTE — DISCHARGE INSTRUCTIONS
Please know that all your lab work was within normal limits.  I have prescribed you medications to help with your pain and discomfort.  Please take those as directed.  Is very important however that you follow-up with your primary care provider next week.  They can test you for multiple things and more things then we can here in the emergency department.  Also please increase your oral fluid intake of an electrolyte substance such as Powerade and Gatorade.  Please do not take other peoples medication it can sometimes have a adverse effect on you.  If you have worsening of your symptoms before you see your primary care provider please return to the ER otherwise follow-up with your PCP.

## 2025-03-05 ENCOUNTER — HOSPITAL ENCOUNTER (OUTPATIENT)
Dept: MAMMOGRAPHY | Facility: HOSPITAL | Age: 50
Discharge: HOME | End: 2025-03-05
Payer: MEDICAID

## 2025-03-05 ENCOUNTER — HOSPITAL ENCOUNTER (OUTPATIENT)
Dept: ULTRASOUND IMAGING | Facility: HOSPITAL | Age: 50
Discharge: HOME | End: 2025-03-05
Payer: MEDICAID

## 2025-03-05 DIAGNOSIS — R10.13 EPIGASTRIC PAIN: ICD-10-CM

## 2025-03-05 DIAGNOSIS — Z12.31 ENCOUNTER FOR SCREENING MAMMOGRAM FOR MALIGNANT NEOPLASM OF BREAST: ICD-10-CM

## 2025-03-05 PROCEDURE — 77063 BREAST TOMOSYNTHESIS BI: CPT

## 2025-03-05 PROCEDURE — 76705 ECHO EXAM OF ABDOMEN: CPT

## 2025-03-05 PROCEDURE — 77067 SCR MAMMO BI INCL CAD: CPT

## 2025-03-10 ENCOUNTER — OFFICE VISIT (OUTPATIENT)
Dept: FAMILY MEDICINE CLINIC | Facility: CLINIC | Age: 50
End: 2025-03-10
Payer: MEDICAID

## 2025-03-10 VITALS
WEIGHT: 222 LBS | OXYGEN SATURATION: 96 % | HEART RATE: 67 BPM | HEIGHT: 63 IN | DIASTOLIC BLOOD PRESSURE: 70 MMHG | SYSTOLIC BLOOD PRESSURE: 125 MMHG | BODY MASS INDEX: 39.34 KG/M2

## 2025-03-10 DIAGNOSIS — E66.812 CLASS 2 SEVERE OBESITY DUE TO EXCESS CALORIES WITH SERIOUS COMORBIDITY AND BODY MASS INDEX (BMI) OF 39.0 TO 39.9 IN ADULT: ICD-10-CM

## 2025-03-10 DIAGNOSIS — R60.0 FLUID RETENTION IN LEGS: Primary | ICD-10-CM

## 2025-03-10 DIAGNOSIS — E66.01 CLASS 2 SEVERE OBESITY DUE TO EXCESS CALORIES WITH SERIOUS COMORBIDITY AND BODY MASS INDEX (BMI) OF 39.0 TO 39.9 IN ADULT: ICD-10-CM

## 2025-03-10 RX ORDER — PHENTERMINE HYDROCHLORIDE 37.5 MG/1
37.5 TABLET ORAL
Qty: 30 TABLET | Refills: 2 | Status: SHIPPED | OUTPATIENT
Start: 2025-03-10

## 2025-03-10 RX ORDER — HYDROCHLOROTHIAZIDE 12.5 MG/1
12.5 TABLET ORAL DAILY
Qty: 30 TABLET | Refills: 1 | Status: SHIPPED | OUTPATIENT
Start: 2025-03-10

## 2025-03-10 NOTE — PROGRESS NOTES
"Chief Complaint  Follow-up (Hospital f/u, lack of urination)    Subjective     Problem List  Visit Diagnosis   Encounters  Notes  Medications  Labs  Result Review Imaging  Media    Kerry Foster presents to [facility]  History of Present Illness    History of Present Illness    49-year-old female that presents for urinary concerns, states that she is recently taking her 's Lasix because she felt really swollen.  She notes that she lost 9 pounds with taking his Lasix once (this was an 80 mg Lasix).  Patient's legs appear fine today, but she notes that they were both very swollen previously.  She denies any orthopnea.    She did want to talk about weight loss, she was not sure if that water retention is Bello to this but she is interested in medication to help her.  She is trying to be more active daily, and eating a balanced diet that sounds like sweets may be an issue for patient.   Blood pressure is well-controlled, she denies uncontrolled anxiety.        Objective   Vital Signs:   /70 (BP Location: Right arm, Patient Position: Sitting, Cuff Size: Large Adult)   Pulse 67   Ht 160 cm (63\")   Wt 101 kg (222 lb)   SpO2 96%   BMI 39.33 kg/m²     Physical Exam  Vitals reviewed.   Constitutional:       General: She is not in acute distress.     Appearance: Normal appearance.   HENT:      Head: Normocephalic and atraumatic.      Mouth/Throat:      Mouth: Mucous membranes are moist.   Eyes:      Conjunctiva/sclera: Conjunctivae normal.   Cardiovascular:      Rate and Rhythm: Normal rate and regular rhythm.      Heart sounds: Normal heart sounds.   Pulmonary:      Effort: Pulmonary effort is normal.      Breath sounds: Normal breath sounds.   Musculoskeletal:         General: No swelling.      Cervical back: Normal range of motion and neck supple.   Skin:     General: Skin is warm.   Neurological:      General: No focal deficit present.      Mental Status: " She is alert.   Psychiatric:         Mood and Affect: Mood normal.         Behavior: Behavior normal.          Physical Exam      Result Review :Labs  Result Review  Imaging  Med Tab  Media  Procedures       Common labs          12/19/2024    09:50 12/30/2024    13:11 3/3/2025    13:21   Common Labs   Glucose 114  105  94    BUN 10  8  16    Creatinine 0.69  0.70  0.93    Sodium 137  133  141    Potassium 3.7  3.9  3.8    Chloride 101  98  101    Calcium 9.3  9.3  9.5    Albumin 4.1  3.9  4.4    Total Bilirubin 0.4  0.2  <0.2    Alkaline Phosphatase 84  85  95    AST (SGOT) 20  19  16    ALT (SGPT) 17  19  15    WBC 8.10  8.28  8.67    Hemoglobin 12.7  13.1  13.4    Hematocrit 39.2  39.0  41.3    Platelets 378  418  439        Results for orders placed or performed during the hospital encounter of 03/03/25   COVID-19, FLU A/B, RSV PCR 1 HR TAT - Swab, Nasopharynx    Collection Time: 03/03/25 12:26 PM    Specimen: Nasopharynx; Swab   Result Value Ref Range    COVID19 Not Detected Not Detected - Ref. Range    Influenza A PCR Not Detected Not Detected    Influenza B PCR Not Detected Not Detected    RSV, PCR Not Detected Not Detected   Comprehensive Metabolic Panel    Collection Time: 03/03/25  1:21 PM    Specimen: Blood   Result Value Ref Range    Glucose 94 65 - 99 mg/dL    BUN 16 6 - 20 mg/dL    Creatinine 0.93 0.57 - 1.00 mg/dL    Sodium 141 136 - 145 mmol/L    Potassium 3.8 3.5 - 5.2 mmol/L    Chloride 101 98 - 107 mmol/L    CO2 28.0 22.0 - 29.0 mmol/L    Calcium 9.5 8.6 - 10.5 mg/dL    Total Protein 8.7 (H) 6.0 - 8.5 g/dL    Albumin 4.4 3.5 - 5.2 g/dL    ALT (SGPT) 15 1 - 33 U/L    AST (SGOT) 16 1 - 32 U/L    Alkaline Phosphatase 95 39 - 117 U/L    Total Bilirubin <0.2 0.0 - 1.2 mg/dL    Globulin 4.3 gm/dL    A/G Ratio 1.0 g/dL    BUN/Creatinine Ratio 17.2 7.0 - 25.0    Anion Gap 12.0 5.0 - 15.0 mmol/L    eGFR 76.0 >60.0 mL/min/1.73   BNP    Collection Time: 03/03/25  1:21 PM    Specimen: Blood   Result Value  Ref Range    proBNP 45.7 0.0 - 450.0 pg/mL   Urinalysis With Microscopic If Indicated (No Culture) - Urine, Clean Catch    Collection Time: 03/03/25  1:21 PM    Specimen: Urine, Clean Catch   Result Value Ref Range    Color, UA Yellow Yellow, Straw    Appearance, UA Clear Clear    pH, UA 5.5 5.0 - 8.0    Specific Gravity, UA 1.026 1.005 - 1.030    Glucose, UA Negative Negative    Ketones, UA Negative Negative    Bilirubin, UA Negative Negative    Blood, UA Negative Negative    Protein, UA Negative Negative    Leuk Esterase, UA Negative Negative    Nitrite, UA Negative Negative    Urobilinogen, UA 0.2 E.U./dL 0.2 - 1.0 E.U./dL   CBC Auto Differential    Collection Time: 03/03/25  1:21 PM    Specimen: Blood   Result Value Ref Range    WBC 8.67 3.40 - 10.80 10*3/mm3    RBC 4.45 3.77 - 5.28 10*6/mm3    Hemoglobin 13.4 12.0 - 15.9 g/dL    Hematocrit 41.3 34.0 - 46.6 %    MCV 92.8 79.0 - 97.0 fL    MCH 30.1 26.6 - 33.0 pg    MCHC 32.4 31.5 - 35.7 g/dL    RDW 13.3 12.3 - 15.4 %    RDW-SD 45.3 37.0 - 54.0 fl    MPV 9.2 6.0 - 12.0 fL    Platelets 439 140 - 450 10*3/mm3    Neutrophil % 71.7 42.7 - 76.0 %    Lymphocyte % 20.0 19.6 - 45.3 %    Monocyte % 7.2 5.0 - 12.0 %    Eosinophil % 0.3 0.3 - 6.2 %    Basophil % 0.5 0.0 - 1.5 %    Immature Grans % 0.3 0.0 - 0.5 %    Neutrophils, Absolute 6.22 1.70 - 7.00 10*3/mm3    Lymphocytes, Absolute 1.73 0.70 - 3.10 10*3/mm3    Monocytes, Absolute 0.62 0.10 - 0.90 10*3/mm3    Eosinophils, Absolute 0.03 0.00 - 0.40 10*3/mm3    Basophils, Absolute 0.04 0.00 - 0.20 10*3/mm3    Immature Grans, Absolute 0.03 0.00 - 0.05 10*3/mm3    nRBC 0.0 0.0 - 0.2 /100 WBC   Gold Top - SST    Collection Time: 03/03/25  1:21 PM   Result Value Ref Range    Extra Tube Hold for add-ons.    Light Blue Top    Collection Time: 03/03/25  1:21 PM   Result Value Ref Range    Extra Tube Hold for add-ons.        Results               ECG 12 Lead    Date/Time: 3/12/2025 8:14 AM  Performed by: Latrell Mathew,  DO    Authorized by: Latrell Mathew DO  Comparison: compared with previous ECG from 10/16/2024  Similar to previous ECG  Rhythm: sinus rhythm  Rate: normal  BPM: 78  Conduction: conduction normal  ST Segments: ST segments normal  T Waves: T waves normal  Other: no other findings    Clinical impression: normal ECG              Assessment and Plan CC Problem List  Visit Diagnosis   ROS  Review (Popup)  [facility]  Quality  BestPractice  Medications  SmartSets  SnapShot Encounters  Media   Diagnoses and all orders for this visit:    1. Fluid retention in legs (Primary)  -     Basic metabolic panel; Future  -     hydroCHLOROthiazide 12.5 MG tablet; Take 1 tablet by mouth Daily.  Dispense: 30 tablet; Refill: 1    2. Class 2 severe obesity due to excess calories with serious comorbidity and body mass index (BMI) of 39.0 to 39.9 in adult  -     ECG 12 Lead  -     phentermine (Adipex-P) 37.5 MG tablet; Take 1 tablet by mouth Every Morning Before Breakfast.  Dispense: 30 tablet; Refill: 2        Assessment & Plan    Risk and benefits of Adipex were discussed. BMI, weight, weight circumference were taken and will be trended.  Willi reviewed and CSA updated.  EKG reviewed and appropriate.  Follow-up in 3 months for weight recheck.    Also trial a mild hydrochlorothiazide for fluid retention in the setting of obesity and hypertension.  To recheck labs in 1 week to make sure that kidney function electrolytes remain normal.  Patient Instructions   Recommend monitoring carbohydrate intake with 0 to 60 g of carbs with meals and 0 to 20 g of carbs with snacks; recommending a small meal or snack every 3-4 hours to help maintain glucose levels and metabolism.  Recommend 30 minutes of daily moderate activity, does not have to mean going to the gym, this means getting your heart rate up with activity for 30 minutes a day.  This does not have to be 30 minutes all at once a can be broken up in 2 segments throughout  the day.  30 minutes of daily activity has been shown to help with metabolism, glucose control, and help reduce cardiovascular risk (heart attack and stroke) long-term.       Recheck BMP/lab in 1 week.     Class 2 Severe Obesity (BMI >=35 and <=39.9). Obesity-related health conditions include the following: hypertension. Obesity is newly identified. BMI is is above average; BMI management plan is completed. We discussed portion control, increasing exercise, and pharmacologic options including adipex .    40 to 64: Counseling/Anticipatory Guidance Discussed: nutrition, physical activity, and healthy weight        Follow Up Instructions Charge Capture  Follow-up Communications   Return in about 3 months (around 6/10/2025).  Patient was given instructions and counseling regarding her condition or for health maintenance advice. Please see specific information pulled into the AVS if appropriate.

## 2025-03-10 NOTE — PATIENT INSTRUCTIONS
Recommend monitoring carbohydrate intake with 0 to 60 g of carbs with meals and 0 to 20 g of carbs with snacks; recommending a small meal or snack every 3-4 hours to help maintain glucose levels and metabolism.  Recommend 30 minutes of daily moderate activity, does not have to mean going to the gym, this means getting your heart rate up with activity for 30 minutes a day.  This does not have to be 30 minutes all at once a can be broken up in 2 segments throughout the day.  30 minutes of daily activity has been shown to help with metabolism, glucose control, and help reduce cardiovascular risk (heart attack and stroke) long-term.       Recheck BMP/lab in 1 week.

## 2025-03-13 ENCOUNTER — LAB (OUTPATIENT)
Dept: LAB | Facility: HOSPITAL | Age: 50
End: 2025-03-13
Payer: MEDICAID

## 2025-03-13 DIAGNOSIS — E03.9 ACQUIRED HYPOTHYROIDISM: ICD-10-CM

## 2025-03-13 DIAGNOSIS — R60.0 FLUID RETENTION IN LEGS: ICD-10-CM

## 2025-03-13 LAB
ANION GAP SERPL CALCULATED.3IONS-SCNC: 11 MMOL/L (ref 5–15)
BUN SERPL-MCNC: 16 MG/DL (ref 6–20)
BUN/CREAT SERPL: 17.2 (ref 7–25)
CALCIUM SPEC-SCNC: 9.6 MG/DL (ref 8.6–10.5)
CHLORIDE SERPL-SCNC: 103 MMOL/L (ref 98–107)
CO2 SERPL-SCNC: 25 MMOL/L (ref 22–29)
CREAT SERPL-MCNC: 0.93 MG/DL (ref 0.57–1)
EGFRCR SERPLBLD CKD-EPI 2021: 76 ML/MIN/1.73
GLUCOSE SERPL-MCNC: 91 MG/DL (ref 65–99)
POTASSIUM SERPL-SCNC: 4.8 MMOL/L (ref 3.5–5.2)
SODIUM SERPL-SCNC: 139 MMOL/L (ref 136–145)
TSH SERPL DL<=0.05 MIU/L-ACNC: 19.9 UIU/ML (ref 0.27–4.2)

## 2025-03-13 PROCEDURE — 84443 ASSAY THYROID STIM HORMONE: CPT

## 2025-03-13 PROCEDURE — 80048 BASIC METABOLIC PNL TOTAL CA: CPT

## 2025-03-13 PROCEDURE — 36415 COLL VENOUS BLD VENIPUNCTURE: CPT

## 2025-03-14 ENCOUNTER — RESULTS FOLLOW-UP (OUTPATIENT)
Dept: FAMILY MEDICINE CLINIC | Facility: CLINIC | Age: 50
End: 2025-03-14
Payer: MEDICAID

## 2025-03-14 DIAGNOSIS — E03.9 ACQUIRED HYPOTHYROIDISM: Primary | ICD-10-CM

## 2025-03-27 ENCOUNTER — TELEPHONE (OUTPATIENT)
Dept: GASTROENTEROLOGY | Facility: CLINIC | Age: 50
End: 2025-03-27
Payer: MEDICAID

## 2025-03-27 NOTE — TELEPHONE ENCOUNTER
Returning patient call to reschedule her appointment with Ava Sanz. She is requesting a Monday. Next appointment on a Monday is 10/20/25. Left voice message for patient to return call.

## 2025-05-07 ENCOUNTER — APPOINTMENT (OUTPATIENT)
Dept: GENERAL RADIOLOGY | Facility: HOSPITAL | Age: 50
End: 2025-05-07
Payer: MEDICAID

## 2025-05-07 ENCOUNTER — HOSPITAL ENCOUNTER (EMERGENCY)
Facility: HOSPITAL | Age: 50
Discharge: HOME | End: 2025-05-07
Attending: EMERGENCY MEDICINE
Payer: MEDICAID

## 2025-05-07 VITALS
HEIGHT: 63 IN | DIASTOLIC BLOOD PRESSURE: 72 MMHG | WEIGHT: 213 LBS | BODY MASS INDEX: 37.74 KG/M2 | OXYGEN SATURATION: 100 % | HEART RATE: 73 BPM | RESPIRATION RATE: 18 BRPM | SYSTOLIC BLOOD PRESSURE: 117 MMHG | TEMPERATURE: 98 F

## 2025-05-07 DIAGNOSIS — M10.241 ACUTE DRUG-INDUCED GOUT OF RIGHT HAND: ICD-10-CM

## 2025-05-07 DIAGNOSIS — M25.40 JOINT SWELLING: Primary | ICD-10-CM

## 2025-05-07 PROCEDURE — 73140 X-RAY EXAM OF FINGER(S): CPT

## 2025-05-07 PROCEDURE — 63710000001 PREDNISONE PER 1 MG

## 2025-05-07 PROCEDURE — 99283 EMERGENCY DEPT VISIT LOW MDM: CPT

## 2025-05-07 RX ORDER — NAPROXEN 250 MG/1
250 TABLET ORAL 2 TIMES DAILY PRN
Qty: 10 TABLET | Refills: 0 | Status: SHIPPED | OUTPATIENT
Start: 2025-05-07

## 2025-05-07 RX ORDER — NAPROXEN 250 MG/1
500 TABLET ORAL ONCE
Status: COMPLETED | OUTPATIENT
Start: 2025-05-07 | End: 2025-05-07

## 2025-05-07 RX ORDER — PREDNISONE 20 MG/1
40 TABLET ORAL ONCE
Status: COMPLETED | OUTPATIENT
Start: 2025-05-07 | End: 2025-05-07

## 2025-05-07 RX ORDER — METHYLPREDNISOLONE 4 MG/1
TABLET ORAL
Qty: 21 TABLET | Refills: 0 | Status: SHIPPED | OUTPATIENT
Start: 2025-05-07 | End: 2025-05-13

## 2025-05-07 RX ADMIN — PREDNISONE 40 MG: 20 TABLET ORAL at 18:59

## 2025-05-07 RX ADMIN — NAPROXEN 500 MG: 250 TABLET ORAL at 18:59

## 2025-05-07 NOTE — DISCHARGE INSTRUCTIONS
Follow up with your primary care provider. Return to ER if symptoms worsen or fail to improve.  Take naproxen 2 times daily for pain and to help with inflammation, do not take ibuprofen while also taking naproxen.  You may take Tylenol in between doses of naproxen if pain persist however do not exceed 4 g in 24 hours.  Take steroid pack as prescribed.  As discussed at this time I would like you to take half of your hydrochlorothiazide dose and discuss cessation of medication with PCP due to it causing inflammation in your joints.

## 2025-05-07 NOTE — ED PROVIDER NOTES
"SHARED VISIT ATTESTATION:    This visit was performed by myself and an APC.  I personally approved the management plan/medical decision making and take responsibility for the patient management.      SHARED VISIT NOTE:    Patient is 49 y.o. year old female that presents to the ED for evaluation of thumb pain for 3 days.     Physical Exam    ED Course:    /74 (BP Location: Right arm, Patient Position: Sitting)   Pulse 84   Temp 98.3 °F (36.8 °C)   Resp 18   Ht 160 cm (63\")   Wt 96.6 kg (213 lb)   LMP  (LMP Unknown)   SpO2 98%   BMI 37.73 kg/m²   Results for orders placed or performed in visit on 03/13/25   Basic metabolic panel    Collection Time: 03/13/25  1:58 PM    Specimen: Blood   Result Value Ref Range    Glucose 91 65 - 99 mg/dL    BUN 16 6 - 20 mg/dL    Creatinine 0.93 0.57 - 1.00 mg/dL    Sodium 139 136 - 145 mmol/L    Potassium 4.8 3.5 - 5.2 mmol/L    Chloride 103 98 - 107 mmol/L    CO2 25.0 22.0 - 29.0 mmol/L    Calcium 9.6 8.6 - 10.5 mg/dL    BUN/Creatinine Ratio 17.2 7.0 - 25.0    Anion Gap 11.0 5.0 - 15.0 mmol/L    eGFR 76.0 >60.0 mL/min/1.73   TSH    Collection Time: 03/13/25  1:58 PM    Specimen: Blood   Result Value Ref Range    TSH 19.900 (H) 0.270 - 4.200 uIU/mL     Medications   predniSONE (DELTASONE) tablet 40 mg (40 mg Oral Given 5/7/25 1859)   naproxen (NAPROSYN) tablet 500 mg (500 mg Oral Given 5/7/25 1859)     XR Finger 2+ View Right  Result Date: 5/7/2025  Narrative: XR FINGER 2+ VW RIGHT Date of Exam: 5/7/2025 6:26 PM EDT Indication: pain and swelling Comparison: None available. Findings: Bone mineral density is normal. No fracture or dislocation. Mild joint space narrowing of the interphalangeal joint and metacarpal phalangeal joint and first carpometacarpal joint. No osseous erosions. No soft tissue gas.     Impression: Impression: Mild joint space narrowing. No fractures or dislocations. No erosive arthritis. Electronically Signed: Tammy Monroy MD  5/7/2025 7:00 PM EDT  " Workstation ID: [id number]      MDM:    Procedures    {Independent Review of (Optional):27430}    {Critical Care:33095}                 Cady Cummins MD  19:17 EDT  05/07/25

## 2025-05-07 NOTE — ED PROVIDER NOTES
Time: 6:20 PM EDT  Date of encounter:  5/7/2025  Independent Historian/Clinical History and Information was obtained by:   Patient    History is limited by: N/A    Chief Complaint: Finger pain      History of Present Illness:  Patient is a 49 y.o. year old female who presents to the emergency department for evaluation of right thumb pain that began 3 days ago.  Patient states that it started to become swollen and warm to the touch and had a small nodule pop up on the joints.  Patient has not taken anything for pain today.  Patient denies any history of gout.  Patient has not had any fevers.  Patient states that she did just start hydrochlorothiazide about 1 month ago and is wondering if this is contributing.      Patient Care Team  Primary Care Provider: Latrell Mathew DO    Past Medical History:     Allergies   Allergen Reactions    Carbamazepine Nausea And Vomiting, Palpitations, Rash and Shortness Of Breath     dizziness, N/V    Codeine Hives and Rash     HIVES    Morphine Anaphylaxis and Shortness Of Breath    Oxycodone Other (See Comments)     hallucinations     Past Medical History:   Diagnosis Date    Arthritis     Breast pain     HTN (hypertension)     Overdose     PONV (postoperative nausea and vomiting)     Vaginal yeast infection      Past Surgical History:   Procedure Laterality Date    ANTERIOR CERVICAL FUSION      BREAST BIOPSY      ENDOSCOPY N/A 1/2/2025    Procedure: ESOPHAGOGASTRODUODENOSCOPY WITH BIOPSIES, BALLOON DILATION 15-18;  Surgeon: Smita Porras MD;  Location: [facility] END[facility]COPY;  Service: Gastroenterology;  Laterality: N/A;  HIATAL HERNIA, GASTRITIS, SCHATZKI'S RING    GALLBLADDER SURGERY      HYSTERECTOMY      LASER ABLATION      TUBAL ABDOMINAL LIGATION      UPPER GASTROINTESTINAL ENDOSCOPY       Family History   Problem Relation Age of Onset    Lung cancer Mother     Cancer Brother         ORAL/MOUTH    Other (Posttraumatic stress disorder) Brother     Drug abuse Brother      Cancer Brother         BONE    Breast cancer Maternal Grandmother         70S    Pancreatic cancer Maternal Grandfather         UNKNOWN AGE     Breast cancer Paternal Grandmother         UNKNOWN AGE     Breast cancer Maternal Aunt         UNKNOWN AGE     Lung cancer Maternal Aunt     Canavan disease Maternal Aunt     Cancer Maternal Aunt         BLADDER    Cancer Paternal Uncle         UNKNOWN ORGIN     Breast cancer Cousin         30S    Breast cancer Cousin         30S       Home Medications:  Prior to Admission medications    Medication Sig Start Date End Date Taking? Authorizing Provider   diclofenac (VOLTAREN) 50 MG EC tablet Take 1 tablet by mouth 3 (Three) Times a Day. 3/3/25   Trinity Muro APRN   hydroCHLOROthiazide 12.5 MG tablet Take 1 tablet by mouth Daily. 3/10/25   Latrell Mathew DO   hyoscyamine (LEVSIN) 0.125 MG SL tablet Take 1 tablet by mouth 4 (Four) Times a Day With Meals & at Bedtime. 12/31/24   Orquidea Sanz APRN   levothyroxine (SYNTHROID, LEVOTHROID) 137 MCG tablet Take 1 tablet by mouth Every Morning. Indications: Underactive Thyroid 12/18/24   Latrell Mathew DO   lisinopril (PRINIVIL,ZESTRIL) 10 MG tablet Take 1 tablet by mouth Daily. Indications: High Blood Pressure 10/10/24   Provider, MD Lilia   ondansetron ODT (ZOFRAN-ODT) 4 MG disintegrating tablet Place 1 tablet on the tongue Every 8 (Eight) Hours As Needed for Nausea or Vomiting. 12/18/24   Latrell Mathew DO   orphenadrine (NORFLEX) 100 MG 12 hr tablet Take 1 tablet by mouth 2 (Two) Times a Day. 3/3/25   Trinity Muro APRN   pantoprazole (PROTONIX) 40 MG EC tablet Take 1 tablet by mouth Daily. 12/31/24   Orquidea Sanz APRN   phentermine (Adipex-P) 37.5 MG tablet Take 1 tablet by mouth Every Morning Before Breakfast. 3/10/25   Latrell Mathew DO   sucralfate (CARAFATE) 1 g tablet Take 1 tablet by mouth 3 (Three) Times a Day With Meals. May dissolve tablets in a small amount of water to help  "with swallowing 12/19/24   Memo Peña MD   venlafaxine XR (EFFEXOR-XR) 150 MG 24 hr capsule Take 1 capsule by mouth Daily With Breakfast. Indications: Major Depressive Disorder 11/25/24   Swati Scales MD        Social History:   Social History     Tobacco Use    Smoking status: Never     Passive exposure: Never    Smokeless tobacco: Never   Vaping Use    Vaping status: Never Used   Substance Use Topics    Alcohol use: Not Currently    Drug use: No         Review of Systems:  Review of Systems   Constitutional:  Negative for appetite change, chills and fever.   Musculoskeletal:  Positive for arthralgias and joint swelling.        Physical Exam:  /72 (BP Location: Right arm, Patient Position: Sitting)   Pulse 73   Temp 98 °F (36.7 °C) (Oral)   Resp 18   Ht 160 cm (63\")   Wt 96.6 kg (213 lb)   LMP  (LMP Unknown)   SpO2 100%   BMI 37.73 kg/m²     Physical Exam  HENT:      Head: Normocephalic.      Mouth/Throat:      Mouth: Mucous membranes are moist.   Eyes:      Pupils: Pupils are equal, round, and reactive to light.   Pulmonary:      Effort: Pulmonary effort is normal.   Abdominal:      General: There is no distension.   Musculoskeletal:      Right hand: Swelling (Swelling and erythema to right outer thumb at the joint line) present. Normal capillary refill. Normal pulse.      Cervical back: Neck supple.   Skin:     General: Skin is warm and dry.   Neurological:      General: No focal deficit present.      Mental Status: She is alert and oriented to person, place, and time.   Psychiatric:         Mood and Affect: Mood normal.         Behavior: Behavior normal.                    Medical Decision Making:      Comorbidities that affect care:    Hypertension, Obesity    External Notes reviewed:    Previous Clinic Note: Patient seen by PCP for fluid retention      The following orders were placed and all results were independently analyzed by me:  Orders Placed This Encounter   Procedures    " XR Finger 2+ View Right       Medications Given in the Emergency Department:  Medications   predniSONE (DELTASONE) tablet 40 mg (40 mg Oral Given 5/7/25 1859)   naproxen (NAPROSYN) tablet 500 mg (500 mg Oral Given 5/7/25 1859)        ED Course:    ED Course as of 05/07/25 2008   Wed May 07, 2025   2008 XR Finger 2+ View Right  No acute fracture [AS]      ED Course User Index  [AS] Seaver, Alyce B, DELPHINE       Labs:    Lab Results (last 24 hours)       ** No results found for the last 24 hours. **             Imaging:    XR Finger 2+ View Right  Result Date: 5/7/2025  XR FINGER 2+ VW RIGHT Date of Exam: 5/7/2025 6:26 PM EDT Indication: pain and swelling Comparison: None available. Findings: Bone mineral density is normal. No fracture or dislocation. Mild joint space narrowing of the interphalangeal joint and metacarpal phalangeal joint and first carpometacarpal joint. No osseous erosions. No soft tissue gas.     Impression: Mild joint space narrowing. No fractures or dislocations. No erosive arthritis. Electronically Signed: Tammy Monroy MD  5/7/2025 7:00 PM EDT  Workstation ID: [id number]        Differential Diagnosis and Discussion:    Joint Pain: Differential diagnosis includes but is not limited to polyarticular arthritis, gout, tendinitis, hemarthrosis, septic arthritis, rheumatoid arthritis, bursitis, degenerative joint disease, joint effusion, autoimmune disorder, trauma, and occult neoplasm.    PROCEDURES:    X-ray were performed in the emergency department and all X-ray impressions were independently interpreted by me.    No orders to display       Procedures    MDM     Amount and/or Complexity of Data Reviewed  Tests in the radiology section of CPT®: reviewed           Patient Care Considerations:    NARCOTICS: I considered prescribing opiate pain medication as an outpatient, however pain controlled with steroids and ibuprofen      Consultants/Shared Management Plan:    SHARED VISIT: I have discussed the case  with my supervising physician, Dr. Cummins who states he agrees with plan of care. The substantive portion of the medical decision was made by the attesting physician who made or approve the management plan and will take responsibility for the patient.  Clinical findings were discussed and ultimate disposition was made in consult with supervising physician.    Social Determinants of Health:    Patient is independent, reliable, and has access to care.       Disposition and Care Coordination:    Discharged: The patient is suitable and stable for discharge with no need for consideration of admission.    I have explained the patient´s condition, diagnoses and treatment plan based on the information available to me at this time. I have answered questions and addressed any concerns. The patient has a good  understanding of the patient´s diagnosis, condition, and treatment plan as can be expected at this point. The vital signs have been stable. The patient´s condition is stable and appropriate for discharge from the emergency department.      The patient will pursue further outpatient evaluation with the primary care physician or other designated or consulting physician as outlined in the discharge instructions. They are agreeable to this plan of care and follow-up instructions have been explained in detail. The patient has received these instructions in written format and has expressed an understanding of the discharge instructions. The patient is aware that any significant change in condition or worsening of symptoms should prompt an immediate return to this or the closest emergency department or call to 911.  I have explained discharge medications and the need for follow up with the patient/caretakers. This was also printed in the discharge instructions. Patient was discharged with the following medications and follow up:      Medication List        New Prescriptions      methylPREDNISolone 4 MG dose pack  Commonly  known as: MEDROL  Take 6 tablets by mouth Daily for 1 day, THEN 5 tablets Daily for 1 day, THEN 4 tablets Daily for 1 day, THEN 3 tablets Daily for 1 day, THEN 2 tablets Daily for 1 day, THEN 1 tablet Daily for 1 day. Take as directed on package instructions.  Start taking on: May 7, 2025     naproxen 250 MG tablet  Commonly known as: NAPROSYN  Take 1 tablet by mouth 2 (Two) Times a Day As Needed for Mild Pain.               Where to Get Your Medications        These medications were sent to [id number] - [place], [place] - [address] AT [facility] OF [address] & [place] - [phone]  - [phone] FX  [address] [place] [address]      Phone: [phone]   methylPREDNISolone 4 MG dose pack  naproxen 250 MG tablet      [address] [address] [phone]             Final diagnoses:   Joint swelling   Acute drug-induced gout of right hand        ED Disposition       ED Disposition   Discharge    Condition   --    Comment   --               This medical record created using voice recognition software.             Seaver, Alyce B, APRN  05/07/25 2008

## 2025-07-11 ENCOUNTER — LAB (OUTPATIENT)
Dept: LAB | Facility: HOSPITAL | Age: 50
End: 2025-07-11
Payer: MEDICAID

## 2025-07-11 ENCOUNTER — OFFICE VISIT (OUTPATIENT)
Dept: FAMILY MEDICINE CLINIC | Facility: CLINIC | Age: 50
End: 2025-07-11
Payer: MEDICAID

## 2025-07-11 VITALS
OXYGEN SATURATION: 98 % | HEIGHT: 63 IN | BODY MASS INDEX: 40.22 KG/M2 | WEIGHT: 227 LBS | SYSTOLIC BLOOD PRESSURE: 125 MMHG | HEART RATE: 97 BPM | DIASTOLIC BLOOD PRESSURE: 80 MMHG

## 2025-07-11 DIAGNOSIS — M79.644 PAIN OF RIGHT THUMB: Primary | ICD-10-CM

## 2025-07-11 DIAGNOSIS — R60.0 FLUID RETENTION IN LEGS: ICD-10-CM

## 2025-07-11 DIAGNOSIS — E66.01 CLASS 2 SEVERE OBESITY DUE TO EXCESS CALORIES WITH SERIOUS COMORBIDITY AND BODY MASS INDEX (BMI) OF 39.0 TO 39.9 IN ADULT: ICD-10-CM

## 2025-07-11 DIAGNOSIS — M79.644 PAIN OF RIGHT THUMB: ICD-10-CM

## 2025-07-11 DIAGNOSIS — M70.62 GREATER TROCHANTERIC BURSITIS OF LEFT HIP: ICD-10-CM

## 2025-07-11 DIAGNOSIS — E66.812 CLASS 2 SEVERE OBESITY DUE TO EXCESS CALORIES WITH SERIOUS COMORBIDITY AND BODY MASS INDEX (BMI) OF 39.0 TO 39.9 IN ADULT: ICD-10-CM

## 2025-07-11 LAB
BASOPHILS # BLD AUTO: 0.05 10*3/MM3 (ref 0–0.2)
BASOPHILS NFR BLD AUTO: 0.5 % (ref 0–1.5)
CRP SERPL-MCNC: 0.5 MG/DL (ref 0–0.5)
DEPRECATED RDW RBC AUTO: 40.3 FL (ref 37–54)
EOSINOPHIL # BLD AUTO: 0.07 10*3/MM3 (ref 0–0.4)
EOSINOPHIL NFR BLD AUTO: 0.7 % (ref 0.3–6.2)
ERYTHROCYTE [DISTWIDTH] IN BLOOD BY AUTOMATED COUNT: 12.1 % (ref 12.3–15.4)
ERYTHROCYTE [SEDIMENTATION RATE] IN BLOOD: 28 MM/HR (ref 0–20)
HCT VFR BLD AUTO: 39.5 % (ref 34–46.6)
HGB BLD-MCNC: 13.7 G/DL (ref 12–15.9)
IMM GRANULOCYTES # BLD AUTO: 0.03 10*3/MM3 (ref 0–0.05)
IMM GRANULOCYTES NFR BLD AUTO: 0.3 % (ref 0–0.5)
LYMPHOCYTES # BLD AUTO: 2.08 10*3/MM3 (ref 0.7–3.1)
LYMPHOCYTES NFR BLD AUTO: 20.8 % (ref 19.6–45.3)
MCH RBC QN AUTO: 32 PG (ref 26.6–33)
MCHC RBC AUTO-ENTMCNC: 34.7 G/DL (ref 31.5–35.7)
MCV RBC AUTO: 92.3 FL (ref 79–97)
MONOCYTES # BLD AUTO: 0.57 10*3/MM3 (ref 0.1–0.9)
MONOCYTES NFR BLD AUTO: 5.7 % (ref 5–12)
NEUTROPHILS NFR BLD AUTO: 7.19 10*3/MM3 (ref 1.7–7)
NEUTROPHILS NFR BLD AUTO: 72 % (ref 42.7–76)
NRBC BLD AUTO-RTO: 0 /100 WBC (ref 0–0.2)
PLATELET # BLD AUTO: 409 10*3/MM3 (ref 140–450)
PMV BLD AUTO: 9.7 FL (ref 6–12)
RBC # BLD AUTO: 4.28 10*6/MM3 (ref 3.77–5.28)
URATE SERPL-MCNC: 4.3 MG/DL (ref 2.4–5.7)
WBC NRBC COR # BLD AUTO: 9.99 10*3/MM3 (ref 3.4–10.8)

## 2025-07-11 PROCEDURE — 36415 COLL VENOUS BLD VENIPUNCTURE: CPT

## 2025-07-11 PROCEDURE — 85652 RBC SED RATE AUTOMATED: CPT

## 2025-07-11 PROCEDURE — 86140 C-REACTIVE PROTEIN: CPT

## 2025-07-11 PROCEDURE — 84550 ASSAY OF BLOOD/URIC ACID: CPT

## 2025-07-11 PROCEDURE — 85025 COMPLETE CBC W/AUTO DIFF WBC: CPT

## 2025-07-11 RX ORDER — HYDROCHLOROTHIAZIDE 12.5 MG/1
12.5 TABLET ORAL DAILY
Qty: 30 TABLET | Refills: 1 | Status: SHIPPED | OUTPATIENT
Start: 2025-07-11

## 2025-07-11 RX ORDER — PREDNISONE 20 MG/1
TABLET ORAL
Qty: 15 TABLET | Refills: 0 | Status: SHIPPED | OUTPATIENT
Start: 2025-07-11 | End: 2025-07-21

## 2025-07-11 RX ORDER — PHENTERMINE HYDROCHLORIDE 37.5 MG/1
37.5 TABLET ORAL
Qty: 30 TABLET | Refills: 2 | Status: SHIPPED | OUTPATIENT
Start: 2025-07-11

## 2025-07-11 NOTE — PROGRESS NOTES
"Chief Complaint  Hand Pain    Subjective     Problem List  Visit Diagnosis   Encounters  Notes  Medications  Labs  Result Review Imaging  Media    Kerry Foster presents to [facility]  History of Present Illness    History of Present Illness    50-year-old female that presents for thumb pain, right.  Ongoing for the past 2 months but worsening, no known injury.  Notes achiness that does limit her activity and  strength.  Patient also notes some left hip pain, consistent with left trochanteric bursitis, notes it is tender to the touch.  Once again denies any injury.  Patient requesting a refill of phentermine, initially prescribed in March, patient notes that she lost approximately 30 pounds but has been regaining the weight as she has been out of the medication and not able to follow-up.  Given the controlled nature we can try to refill it but patient must keep her 3-month recheck, given that it was recently prescribed she may be denied without a 6-month wait.  Depending on regulations.     Objective   Vital Signs:   /80 (BP Location: Right arm, Patient Position: Sitting, Cuff Size: Large Adult)   Pulse 97   Ht 160 cm (63\")   Wt 103 kg (227 lb)   SpO2 98%   BMI 40.21 kg/m²     Physical Exam     Physical Exam      Result Review :Labs  Result Review  Imaging  Med Tab  Media  Procedures       Common labs          3/3/2025    13:21 3/13/2025    13:58 7/11/2025    15:28   Common Labs   Glucose 94  91     BUN 16  16     Creatinine 0.93  0.93     Sodium 141  139     Potassium 3.8  4.8     Chloride 101  103     Calcium 9.5  9.6     Albumin 4.4      Total Bilirubin <0.2      Alkaline Phosphatase 95      AST (SGOT) 16      ALT (SGPT) 15      WBC 8.67   9.99    Hemoglobin 13.4   13.7    Hematocrit 41.3   39.5    Platelets 439   409    Uric Acid   4.3        Results for orders placed or performed in visit on 07/11/25   Sedimentation rate, automated    Collection " Time: 07/11/25  3:28 PM    Specimen: Blood   Result Value Ref Range    Sed Rate 28 (H) 0 - 20 mm/hr   C-reactive protein    Collection Time: 07/11/25  3:28 PM    Specimen: Blood   Result Value Ref Range    C-Reactive Protein 0.50 0.00 - 0.50 mg/dL   Uric acid    Collection Time: 07/11/25  3:28 PM    Specimen: Blood   Result Value Ref Range    Uric Acid 4.3 2.4 - 5.7 mg/dL   CBC Auto Differential    Collection Time: 07/11/25  3:28 PM    Specimen: Blood   Result Value Ref Range    WBC 9.99 3.40 - 10.80 10*3/mm3    RBC 4.28 3.77 - 5.28 10*6/mm3    Hemoglobin 13.7 12.0 - 15.9 g/dL    Hematocrit 39.5 34.0 - 46.6 %    MCV 92.3 79.0 - 97.0 fL    MCH 32.0 26.6 - 33.0 pg    MCHC 34.7 31.5 - 35.7 g/dL    RDW 12.1 (L) 12.3 - 15.4 %    RDW-SD 40.3 37.0 - 54.0 fl    MPV 9.7 6.0 - 12.0 fL    Platelets 409 140 - 450 10*3/mm3    Neutrophil % 72.0 42.7 - 76.0 %    Lymphocyte % 20.8 19.6 - 45.3 %    Monocyte % 5.7 5.0 - 12.0 %    Eosinophil % 0.7 0.3 - 6.2 %    Basophil % 0.5 0.0 - 1.5 %    Immature Grans % 0.3 0.0 - 0.5 %    Neutrophils, Absolute 7.19 (H) 1.70 - 7.00 10*3/mm3    Lymphocytes, Absolute 2.08 0.70 - 3.10 10*3/mm3    Monocytes, Absolute 0.57 0.10 - 0.90 10*3/mm3    Eosinophils, Absolute 0.07 0.00 - 0.40 10*3/mm3    Basophils, Absolute 0.05 0.00 - 0.20 10*3/mm3    Immature Grans, Absolute 0.03 0.00 - 0.05 10*3/mm3    nRBC 0.0 0.0 - 0.2 /100 WBC       Results             Procedures        Assessment and Plan CC Problem List  Visit Diagnosis   ROS  Review (Popup)  Health Maintenance  Quality  BestPractice  Medications  SmartSets  SnapShot Encounters  Media   Diagnoses and all orders for this visit:    1. Pain of right thumb (Primary)  -     Sedimentation rate, automated; Future  -     C-reactive protein; Future  -     Uric acid; Future  -     CBC w AUTO Differential; Future  -     predniSONE (DELTASONE) 20 MG tablet; Take 2 tablets by mouth Daily for 5 days, THEN 1 tablet Daily for 5 days.  Dispense: 15 tablet;  Refill: 0  -     Ambulatory Referral to Hand Surgery    2. Fluid retention in legs  -     hydroCHLOROthiazide 12.5 MG tablet; Take 1 tablet by mouth Daily.  Dispense: 30 tablet; Refill: 1    3. Class 2 severe obesity due to excess calories with serious comorbidity and body mass index (BMI) of 39.0 to 39.9 in adult  -     phentermine (Adipex-P) 37.5 MG tablet; Take 1 tablet by mouth Every Morning Before Breakfast.  Dispense: 30 tablet; Refill: 2    4. Greater trochanteric bursitis of left hip        Assessment & Plan    Discussed a trial steroid for both the hand pain and the hip bursitis.  Offered referral to specialist, refer referrals placed.  Refill of Adipex sent, patient must keep 3-month follow-up.  Willi reviewed.  Patient also needing refill of hydrochlorothiazide.  Blood pressure well-controlled and this is noted to help fluid retention in legs previously.  Patient Instructions   Recommend monitoring carbohydrate intake with 0 to 60 g of carbs with meals and 0 to 20 g of carbs with snacks; recommending a small meal or snack every 3-4 hours to help maintain glucose levels and metabolism.  Recommend 30 minutes of daily moderate activity, does not have to mean going to the gym, this means getting your heart rate up with activity for 30 minutes a day.  This does not have to be 30 minutes all at once a can be broken up in 2 segments throughout the day.  30 minutes of daily activity has been shown to help with metabolism, glucose control, and help reduce cardiovascular risk (heart attack and stroke) long-term.             Follow Up Instructions Charge Capture  Follow-up Communications   Return in about 3 months (around 10/11/2025).  Patient was given instructions and counseling regarding her condition or for health maintenance advice. Please see specific information pulled into the AVS if appropriate.

## 2025-07-17 NOTE — PATIENT INSTRUCTIONS
Recommend monitoring carbohydrate intake with 0 to 60 g of carbs with meals and 0 to 20 g of carbs with snacks; recommending a small meal or snack every 3-4 hours to help maintain glucose levels and metabolism.  Recommend 30 minutes of daily moderate activity, does not have to mean going to the gym, this means getting your heart rate up with activity for 30 minutes a day.  This does not have to be 30 minutes all at once a can be broken up in 2 segments throughout the day.  30 minutes of daily activity has been shown to help with metabolism, glucose control, and help reduce cardiovascular risk (heart attack and stroke) long-term.

## 2025-08-22 ENCOUNTER — APPOINTMENT (OUTPATIENT)
Dept: GENERAL RADIOLOGY | Facility: HOSPITAL | Age: 50
End: 2025-08-22
Payer: MEDICAID

## 2025-08-22 ENCOUNTER — HOSPITAL ENCOUNTER (EMERGENCY)
Facility: HOSPITAL | Age: 50
Discharge: HOME | End: 2025-08-23
Attending: EMERGENCY MEDICINE
Payer: MEDICAID

## (undated) DEVICE — SINGLE-USE BIOPSY FORCEPS: Brand: RADIAL JAW 4

## (undated) DEVICE — SOLIDIFIER LIQLOC PLS 1500CC BT

## (undated) DEVICE — BLCK/BITE BLOX WO/DENTL/RIM W/STRAP 54F

## (undated) DEVICE — Device

## (undated) DEVICE — DEV INFL CRE STERIFLATE 60CC DISP

## (undated) DEVICE — Device: Brand: DEFENDO AIR/WATER/SUCTION AND BIOPSY VALVE

## (undated) DEVICE — LINER SURG CANSTR SXN S/RIGD 1500CC

## (undated) DEVICE — ESOPHAGEAL BALLOON DILATATION CATHETER: Brand: CRE FIXED WIRE

## (undated) DEVICE — SOL IRRG H2O PL/BG 1000ML STRL

## (undated) DEVICE — CONN JET HYDRA H20 AUXILIARY DISP